# Patient Record
Sex: FEMALE | Race: WHITE
[De-identification: names, ages, dates, MRNs, and addresses within clinical notes are randomized per-mention and may not be internally consistent; named-entity substitution may affect disease eponyms.]

---

## 2019-12-05 ENCOUNTER — HOSPITAL ENCOUNTER (EMERGENCY)
Dept: HOSPITAL 97 - ER | Age: 64
Discharge: HOME | End: 2019-12-05
Payer: COMMERCIAL

## 2019-12-05 VITALS — SYSTOLIC BLOOD PRESSURE: 121 MMHG | DIASTOLIC BLOOD PRESSURE: 69 MMHG

## 2019-12-05 VITALS — OXYGEN SATURATION: 95 %
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
## 2019-12-05 DIAGNOSIS — F17.210: ICD-10-CM

## 2019-12-05 DIAGNOSIS — Z79.82: ICD-10-CM

## 2019-12-05 DIAGNOSIS — Z86.73: ICD-10-CM

## 2019-12-05 DIAGNOSIS — E78.5: ICD-10-CM

## 2019-12-05 DIAGNOSIS — S30.1XXA: Primary | ICD-10-CM
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
## 2019-12-05 LAB
ALBUMIN SERPL BCP-MCNC: 3.4 G/DL (ref 3.4–5)
ALP SERPL-CCNC: 110 U/L (ref 45–117)
ALT SERPL W P-5'-P-CCNC: 20 U/L (ref 12–78)
AST SERPL W P-5'-P-CCNC: 15 U/L (ref 15–37)
BUN BLD-MCNC: 11 MG/DL (ref 7–18)
GLUCOSE SERPLBLD-MCNC: 168 MG/DL (ref 74–106)
HCT VFR BLD CALC: 38.6 % (ref 36–45)
LIPASE SERPL-CCNC: 54 U/L (ref 73–393)
LYMPHOCYTES # SPEC AUTO: 3.2 K/UL (ref 0.7–4.9)
PMV BLD: 8.5 FL (ref 7.6–11.3)
POTASSIUM SERPL-SCNC: 3.6 MMOL/L (ref 3.5–5.1)
RBC # BLD: 4.43 M/UL (ref 3.86–4.86)

## 2019-12-05 PROCEDURE — 80076 HEPATIC FUNCTION PANEL: CPT

## 2019-12-05 PROCEDURE — 83690 ASSAY OF LIPASE: CPT

## 2019-12-05 PROCEDURE — 99284 EMERGENCY DEPT VISIT MOD MDM: CPT

## 2019-12-05 PROCEDURE — 73552 X-RAY EXAM OF FEMUR 2/>: CPT

## 2019-12-05 PROCEDURE — 80048 BASIC METABOLIC PNL TOTAL CA: CPT

## 2019-12-05 PROCEDURE — 73562 X-RAY EXAM OF KNEE 3: CPT

## 2019-12-05 PROCEDURE — 70450 CT HEAD/BRAIN W/O DYE: CPT

## 2019-12-05 PROCEDURE — 73060 X-RAY EXAM OF HUMERUS: CPT

## 2019-12-05 PROCEDURE — 74177 CT ABD & PELVIS W/CONTRAST: CPT

## 2019-12-05 PROCEDURE — 71045 X-RAY EXAM CHEST 1 VIEW: CPT

## 2019-12-05 PROCEDURE — 85025 COMPLETE CBC W/AUTO DIFF WBC: CPT

## 2019-12-05 PROCEDURE — 36415 COLL VENOUS BLD VENIPUNCTURE: CPT

## 2019-12-05 NOTE — RAD REPORT
EXAM DESCRIPTION:  RAD - Knee Left 3 View - 12/5/2019 10:59 am

 

CLINICAL HISTORY:   Left knee pain status post injury

 

FINDINGS:  No fracture or dislocation is seen.

 

Bones are osteoporotic

## 2019-12-05 NOTE — RAD REPORT
EXAM DESCRIPTION:  CT - Abdomen   Pelvis W Contrast - 12/5/2019 11:50 am

 

CLINICAL HISTORY:  Abdominal pain

 

COMPARISON:  2012

 

TECHNIQUE:  Computed axial tomography of the abdomen pelvis was obtained. 100 cc Isovue-300 was admin
istered intravenously. Oral contrast was not requested which limits evaluation of bowel.

 

All CT scans are performed using dose optimization technique as appropriate and may include automated
 exposure control or mA/KV adjustment according to patient size.

 

FINDINGS:  Fatty liver

 

The spleen, pancreas, adrenals and left kidney unremarkable

 

Malrotation of the right kidney. Right kidney is otherwise unremarkable

 

There is no evidence of diverticulitis. Hysterectomy. Cholecystectomy

 

Duodenal diverticulum

 

IMPRESSION:  No acute abnormality is displayed.

## 2019-12-05 NOTE — RAD REPORT
EXAM DESCRIPTION:  CT - Head Brain Wo Cont - 12/5/2019 11:50 am

 

CLINICAL HISTORY:  Head injury status post fall. Headache

 

COMPARISON:  2015

 

TECHNIQUE:  Computed axial tomography of the head was obtained. IV contrast was not requested.

 

All CT scans are performed using dose optimization technique as appropriate and may include automated
 exposure control or mA/KV adjustment according to patient size.

 

FINDINGS:  An intracranial  bleed is not seen .

 

The ventricles are normal in caliber.

 

No extra-axial fluid collection is noted.

 

Moderate low-density areas present within the right frontal lobe extending into the right internal ca
psule and basal ganglia compatible with old infarction.

 

Fluid within the sinuses/ mastoids is not seen.

 

IMPRESSION:  No acute intracranial abnormality is seen. If patient's symptoms persist  MRI of the bra
in would be recommended.

## 2019-12-05 NOTE — RAD REPORT
EXAM DESCRIPTION:  RAD - Humerus Left - 12/5/2019 10:59 am

 

CLINICAL HISTORY:  Fall, left arm pain

 

COMPARISON:  None.

 

FINDINGS:  No fracture is identified.  There is no dislocation or periosteal reaction noted. No forei
gn body or other soft tissue abnormality. Patient has degenerative change at the AC joint without spu
rring. Acromial humeral joint space is normal. Elbow joint is not optimally imaged; however, no signi
ficant degenerative change or acute finding seen.

 

IMPRESSION:  No fracture or acute left humerus finding.

## 2019-12-05 NOTE — ER
Nurse's Notes                                                                                     

 [facility]                                                                 

Name: Kailee Whitley                                                                             

Age: 64 yrs                                                                                       

Sex: Female                                                                                       

: 1955                                                                                   

MRN: [id number]                                                                                   

Arrival Date: 2019                                                                          

Time: 10:02                                                                                       

Account#: [id number]                                                                            

Bed 14                                                                                            

Private MD:                                                                                       

Diagnosis: Pain in left knee;Contusion of abdominal wall;Fall from bed                            

                                                                                                  

Presentation:                                                                                     

                                                                                             

09:56 Presenting complaint: EMS states: Pt. is A \T\ O x 4 and fell last night, c/o left knee   rb1

      pain 8/10. [facility] reports that the pt. face looks more swollen than usual,       

      but some swelling is normal for the pt. History of CVA, left sided deficits,                

      hypertension, and GERD. NKDA. /85,P 102, R 98% RA, R 16. Transition of care:          

      [facility]. Onset of symptoms was 2019. Risk Assessment: Do you         

      want to hurt yourself or someone else? Patient reports no desire to harm self or            

      others. Initial Sepsis Screen: Does the patient meet any 2 criteria? No. Patient's          

      initial sepsis screen is negative. Does the patient have a suspected source of              

      infection? No. Patient's initial sepsis screen is negative. Care prior to arrival: None.    

09:56 Method Of Arrival: EMS: [facility]                                                       rb1 

09:56 Acuity: LEATHA 3                                                                           rb1 

                                                                                                  

Triage Assessment:                                                                                

09:56 General: Appears in no apparent distress. comfortable, Behavior is calm, cooperative,   rb1 

      Denies fever, feeling ill. Pain: Complains of pain in left knee and right thigh Pain        

      currently is 8 out of 10 on a pain scale. Pain began 1 day ago. Neuro: Level of             

      Consciousness is awake, alert, obeys commands, Oriented to person, place, time,             

      situation. Cardiovascular: Capillary refill < 3 seconds is brisk in bilateral fingers.      

      Respiratory: Airway is patent Respiratory effort is even, unlabored, Respiratory            

      pattern is regular, symmetrical. GI: No signs and/or symptoms were reported involving       

      the gastrointestinal system. : No signs and/or symptoms were reported regarding the       

      genitourinary system. Derm: Bruising that is yellow, on left knee, left shin.               

09:56 Derm: Bruising that is dark purple, on epigastric area Pt. reports when she fell        rb1 

      yesterday that she hit the brake on her wheelchair that is next to the bed..                

      Musculoskeletal: Range of motion: left sided deficits due to previous stroke.               

                                                                                                  

Historical:                                                                                       

- Allergies:                                                                                      

09:56 No Known Allergies;                                                                     rb1 

- Home Meds:                                                                                      

:56 alendronate 70 mg/75 mL Oral soln 75 mL once wkly [Active]; aspirin 325 mg Oral tab 1   rb1 

      tab once daily [Active]; atorvastatin 40 mg oral tab [Active]; baclofen 10 mg Oral tab      

      1 tab 3 times per day [Active]; cyanocobalamin (vitamin B-12) 100 mcg oral tab daily        

      [Active]; cyclobenzaprine 10 mg Oral tab 1 tab 2 times per day [Active]; Ditropan XL 5      

      mg Oral tr24 1 tab once daily [Active]; duloxetine 60 mg Oral cpDR 2 caps once daily        

      [Active]; docusate sodium 250 mg Oral cap 1 cap once daily [Active]; hydroxyzine            

      pamoate 25 mg Oral cap 1 cap 3 times per day [Active]; ipratropium-albuterol 0.5 mg-3       

      mg(2.5 mg base)/3 mL Inhl nebu 3 mL 4 times per day [Active]; loratadine 10 mg oral tab     

      1 tab once daily [Active]; losartan 50 mg oral tab 2 tabs once daily [Active]; Lyrica       

      75 mg Oral 1 cap 3 times per day [Active]; melatonin 3 mg Oral tab 2 tabs nightly           

      [Active]; metoprolol tartrate 25 mg Oral tab 1 tab 2 times per day [Active];                

      mirtazapine 30 mg Oral tab 1 tab once daily [Active]; multivitamin oral oral [Active];      

09:56 Norco  mg Oral tab 1 tab three times a day [Active]; omeprazole 20 mg Oral TbEC   rb1 

      20 mg every 8 hours [Active]; Vitamin D3 oral 500-200 mg-unit oral 1 tab twice a day        

      [Active]; propranolol 40 mg Oral tab 1 tab 2 times per day [Active]; ropinrole hcl 0.25     

      mg 1 tab nightly [Active]; senna 8.6 mg oral cap 2 caps two times a day [Active];           

- PMHx:                                                                                           

09:56 acid reflux; CVA; Diverticulitis; Hyperlipidemia; TIA;                                  rb1 

                                                                                                  

- Immunization history:: Adult Immunizations up to date.                                          

- Social history:: Smoking status: Patient uses tobacco products, smokes one-half pack            

  cigarettes per day.                                                                             

- Ebola Screening: : Patient negative for fever greater than or equal to 101.5 degrees            

  Fahrenheit, and additional compatible Ebola Virus Disease symptoms.                             

                                                                                                  

                                                                                                  

Screenin:56 Abuse screen: Denies threats or abuse. Nutritional screening: No deficits noted.        rb1 

      Tuberculosis screening: No symptoms or risk factors identified. Fall Risk Fall in past      

      12 months (25 points). Secondary diagnosis (15 points) CVA, IV access (20 points).          

      Ambulatory Aid- Crutches/Cane/Walker (15 pts). Gait- Normal/Bed Rest/Wheelchair (0 pts)     

      Mental Status- Oriented to own ability (0 pts). Total Lloyd Fall Scale indicates High       

      Risk Score (45 or more points). Fall prevention measures have been instituted. Side         

      Rails Up X 2 Placed Close to Nursing Station 1:1 Attendant Assigned Frequent                

      Obs/Assessments Occuring As available patient and family educated on Fall Prevention        

      Program and Strategies.                                                                     

                                                                                                  

Assessment:                                                                                       

09:56 General: See triage assessment.                                                         rb1 

11:00 Reassessment: Patient appears in no apparent distress at this time. No changes from     rb1 

      previously documented assessment.                                                           

11:30 Reassessment: Patient appears in no apparent distress at this time. Patient and/or      rb1 

      family updated on plan of care and expected duration. Pain level reassessed. Patient is     

      alert, oriented x 3, equal unlabored respirations, skin warm/dry/pink.                      

12:12 Reassessment: Patient appears in no apparent distress at this time. Pt. is resting with rb1 

      eyes closed, respirations even, unlabored. Call light within reach.                         

12:55 Reassessment: Called [place] and gave report, requested transportation back to the    [facility] 

      facility. They will provide transportation, will call us back when they make                

      arrangements.                                                                               

13:03 Reassessment: Discharge pending due to transportation back to the facility.             rb1 

13:22 Reassessment: Patient appears in no apparent distress at this time. Patient and/or      rb1 

      family updated on plan of care and expected duration. Pain level reassessed. Patient is     

      alert, oriented x 3, equal unlabored respirations, skin warm/dry/pink. Awaiting             

      transportation back to the facililty.                                                       

13:45 Reassessment: Patient appears in no apparent distress at this time. No changes from     rb1 

      previously documented assessment.                                                           

14:07 Reassessment: Patient appears in no apparent distress at this time. Patient and/or      rb1 

      family updated on plan of care and expected duration. Pain level reassessed. Patient is     

      alert, oriented x 3, equal unlabored respirations, skin warm/dry/pink. Transportation       

      has arrived.                                                                                

                                                                                                  

Vital Signs:                                                                                      

09:56  / 81; Pulse 102; Resp 16; Temp 98.1(O); Pulse Ox 96% on R/A; Weight 76.66 kg     rb1 

      (R); Height 5 ft. 1 in. (154.94 cm) (R); Pain 8/10;                                         

11:00  / 73; Pulse 78; Resp 19; Pulse Ox 95% on R/A; Pain 8/10;                         rb1 

11:30  / 69; Pulse 76; Resp 21; Pulse Ox 95% on R/A;                                    rb1 

12:30  / 71; Pulse 70; Resp 17; Pulse Ox 95% on R/A;                                    rb1 

13:30  / 78; Pulse 71; Resp 15; Temp 98.3(O); Pulse Ox 96% on R/A; Pain 6/10;           rb1 

09:56 Body Mass Index 31.93 (76.66 kg, 154.94 cm)                                             rb1 

                                                                                                  

ED Course:                                                                                        

09:56 Arm band placed on right wrist.                                                         rb1 

09:56 Patient has correct armband on for positive identification. Placed in gown. Bed in low  rb1 

      position. Call light in reach. Side rails up X2. Cardiac monitor on. Pulse ox on. NIBP      

      on. Warm blanket given.                                                                     

10:02 Patient arrived in ED.                                                                  em1 

10:07 Jazz Campbell, RN is Primary Nurse.                                                   rb1 

10:13 Triage completed.                                                                       rb1 

10:14 Bernadette Alberto FNP-C is PHCP.                                                        kb  

10:14 Yusef Pruitt MD is Attending Physician.                                                kb  

11:02 Initial lab(s) drawn, by me, sent to lab. Inserted saline lock: 22 gauge in right       jb1 

      antecubital area, using aseptic technique. Blood collected.                                 

11:20 Femur Right XRAY In Process Unspecified.                                                EDMS

11:20 Knee Left 3 View XRAY In Process Unspecified.                                           EDMS

11:20 Humerus Left XRAY In Process Unspecified.                                               EDMS

11:20 Chest Single View XRAY In Process Unspecified.                                          EDMS

12:05 CT Head Brain wo Cont In Process Unspecified.                                           EDMS

12:05 CT Abd/Pelvis - IV Contrast Only In Process Unspecified.                                EDMS

14:11 No provider procedures requiring assistance completed. IV discontinued, intact,         rb1 

      bleeding controlled, No redness/swelling at site. Pressure dressing applied.                

                                                                                                  

Administered Medications:                                                                         

No medications were administered                                                                  

                                                                                                  

                                                                                                  

Outcome:                                                                                          

12:48 Discharge ordered by MD.                                                                kb  

14:11 Patient left the ED.                                                                    rb1 

14:11 Discharged to [facility] by their transportation service.                        rb1 

14:11 Condition: stable                                                                           

14:11 Discharge instructions given to patient, Instructed on discharge instructions, follow       

      up and referral plans. Demonstrated understanding of instructions, follow-up care,          

      Prescriptions given X none                                                                  

                                                                                                  

Signatures:                                                                                       

Dispatcher [employer]                           EDMS                                                 

Cheo Bruno                                 jb1                                                  

Bernadette Alberto, ELIJAH SOMMER-Robert Joyce                               em1                                                  

Jazz Campbell, RN                     RN   rb1                                                  

                                                                                                  

Corrections: (The following items were deleted from the chart)                                    

15:36 09:56  / 81; Pulse 102bpm; Resp 16bpm; Pulse Ox 96% RA; 76.66 kg Reported; Height rb1 

      5 ft. 1 in. Reported; BMI: 31.9; Pain 8/10; rb1                                             

15:36 12:30  / 78; Pulse 71bpm; Resp 15bpm; Pulse Ox 96% RA; Temp 98.3F Oral; Pain      rb1 

      6/10; rb1                                                                                   

                                                                                                  

**************************************************************************************************

## 2019-12-05 NOTE — RAD REPORT
EXAM DESCRIPTION:  RAD - Chest Single View - 12/5/2019 10:59 am

 

CLINICAL HISTORY:  Fall, chest pain

 

COMPARISON:  August 2016

 

TECHNIQUE:  AP portable chest image was obtained 1048 hours .

 

FINDINGS:  No pulmonary contusion or acute lung parenchymal process. Interstitial pattern is not subs
tantially different from comparison. Minimal edema or infiltrate could be masked by the chronic patte
rn. Heart and vasculature are normal. No measurable pleural effusion and no pneumothorax. No gross olvin
ny abnormality seen on portable imaging. No acute aortic findings suspected.

 

IMPRESSION:  No acute cardiopulmonary process.

## 2019-12-05 NOTE — RAD REPORT
EXAM DESCRIPTION:  RAD - Femur Right - 12/5/2019 10:59 am

 

CLINICAL HISTORY:  Fall, right leg pain

 

COMPARISON:  None.

 

FINDINGS:  No fracture, dislocation or periosteal reaction noted. Patient has minimal right hip joint
 degenerative change. Femoral head maintains smooth rounded contour. Degenerative changes are present
 at the knee joint. Medial compartment and patellofemoral compartment marginal spurs are present. No 
measurable joint effusion. No air or foreign body in the soft tissues.

 

IMPRESSION:  Hip and knee joint degenerative changes are present but no fracture or acute finding see
n.

## 2019-12-05 NOTE — EDPHYS
Physician Documentation                                                                           

 [facility]                                                                 

Name: Kailee Whitley                                                                             

Age: 64 yrs                                                                                       

Sex: Female                                                                                       

: 1955                                                                                   

MRN: [id number]                                                                                   

Arrival Date: 2019                                                                          

Time: 10:02                                                                                       

Account#: [id number]                                                                            

Bed 14                                                                                            

Private MD:                                                                                       

ED Physician Yusef Pruitt                                                                         

HPI:                                                                                              

                                                                                             

10:29 This 64 yrs old  Female presents to ER via EMS with complaints of Left Knee    kb  

      pain.                                                                                       

10:29 Details of fall: The patient fell from a height, off furniture, approximately 3 feet.   kb  

      Onset: The symptoms/episode began/occurred last night. Associated injuries: The patient     

      sustained injury to the abdomen, specifically the epigastric area, ecchymosis,              

      tenderness, left knee, ecchymosis, painful injury. Severity of symptoms: At their worst     

      the symptoms were moderate, in the emergency department the symptoms are unchanged. The     

      patient has experienced similar episodes in the past. The patient has not recently seen     

      a physician. Pt reports she fell out of bed twice last night, c/o left knee pain.           

      States the nursing home staff said her face looked more swollen as well, but she            

      normally has some facial swelling. No obvious swelling noted compared to pt profile         

      picture from nursing home. Pt denies any other pain. Upon exam there is a bruise to         

      epigastric area. Pt reports she hit that spot with the wheelchair when she fell, but it     

      only hurts upon palpations. Reports tenderness to upper left arm and right leg upon         

      exam as well. .                                                                             

                                                                                                  

Historical:                                                                                       

- Allergies:                                                                                      

09:56 No Known Allergies;                                                                     rb1 

- Home Meds:                                                                                      

09:56 alendronate 70 mg/75 mL Oral soln 75 mL once wkly [Active]; aspirin 325 mg Oral tab 1   rb1 

      tab once daily [Active]; atorvastatin 40 mg oral tab [Active]; baclofen 10 mg Oral tab      

      1 tab 3 times per day [Active]; cyanocobalamin (vitamin B-12) 100 mcg oral tab daily        

      [Active]; cyclobenzaprine 10 mg Oral tab 1 tab 2 times per day [Active]; Ditropan XL 5      

      mg Oral tr24 1 tab once daily [Active]; duloxetine 60 mg Oral cpDR 2 caps once daily        

      [Active]; docusate sodium 250 mg Oral cap 1 cap once daily [Active]; hydroxyzine            

      pamoate 25 mg Oral cap 1 cap 3 times per day [Active]; ipratropium-albuterol 0.5 mg-3       

      mg(2.5 mg base)/3 mL Inhl nebu 3 mL 4 times per day [Active]; loratadine 10 mg oral tab     

      1 tab once daily [Active]; losartan 50 mg oral tab 2 tabs once daily [Active]; Lyrica       

      75 mg Oral 1 cap 3 times per day [Active]; melatonin 3 mg Oral tab 2 tabs nightly           

      [Active]; metoprolol tartrate 25 mg Oral tab 1 tab 2 times per day [Active];                

      mirtazapine 30 mg Oral tab 1 tab once daily [Active]; multivitamin oral oral [Active];      

09:56 Norco  mg Oral tab 1 tab three times a day [Active]; omeprazole 20 mg Oral TbEC   rb1 

      20 mg every 8 hours [Active]; Vitamin D3 oral 500-200 mg-unit oral 1 tab twice a day        

      [Active]; propranolol 40 mg Oral tab 1 tab 2 times per day [Active]; ropinrole hcl 0.25     

      mg 1 tab nightly [Active]; senna 8.6 mg oral cap 2 caps two times a day [Active];           

- PMHx:                                                                                           

09:56 acid reflux; CVA; Diverticulitis; Hyperlipidemia; TIA;                                  rb1 

                                                                                                  

- Immunization history:: Adult Immunizations up to date.                                          

- Social history:: Smoking status: Patient uses tobacco products, smokes one-half pack            

  cigarettes per day.                                                                             

- Ebola Screening: : Patient negative for fever greater than or equal to 101.5 degrees            

  Fahrenheit, and additional compatible Ebola Virus Disease symptoms.                             

                                                                                                  

                                                                                                  

ROS:                                                                                              

10:27 Constitutional: Negative for fever, chills, and weight loss, ENT: Negative for injury,  kb  

      pain, and discharge, Neck: Negative for injury, pain, and swelling, Cardiovascular:         

      Negative for chest pain, palpitations, and edema, Respiratory: Negative for shortness       

      of breath, cough, wheezing, and pleuritic chest pain, Abdomen/GI: Negative for              

      abdominal pain, nausea, vomiting, diarrhea, and constipation, Back: Negative for injury     

      and pain, Skin: Negative for injury, rash, and discoloration, Neuro: Negative for           

      headache, weakness, numbness, tingling, and seizure.                                        

10:27 MS/extremity: Positive for injury or acute deformity, ecchymosis, pain, swelling,           

      tenderness.                                                                                 

                                                                                                  

Exam:                                                                                             

10:21 Constitutional:  This is a well developed, well nourished patient who is awake, alert,  kb  

      and in no acute distress. Head/Face:  Normocephalic, atraumatic. Eyes:  Pupils equal        

      round and reactive to light, extra-ocular motions intact.  Lids and lashes normal.          

      Conjunctiva and sclera are non-icteric and not injected.  Cornea within normal limits.      

      Periorbital areas with no swelling, redness, or edema. ENT:  Nares patent. No nasal         

      discharge, no septal abnormalities noted.  Tympanic membranes are normal and external       

      auditory canals are clear.  Oropharynx with no redness, swelling, or masses, exudates,      

      or evidence of obstruction, uvula midline.  Mucous membranes moist. Neck:  Trachea          

      midline, no thyromegaly or masses palpated, and no cervical lymphadenopathy.  Supple,       

      full range of motion without nuchal rigidity, or vertebral point tenderness.  No            

      Meningismus. Chest/axilla:  Normal chest wall appearance and motion.  Nontender with no     

      deformity.  No lesions are appreciated. Cardiovascular:  Regular rate and rhythm with a     

      normal S1 and S2.  No gallops, murmurs, or rubs.  Normal PMI, no JVD.  No pulse             

      deficits. Respiratory:  Lungs have equal breath sounds bilaterally, clear to                

      auscultation and percussion.  No rales, rhonchi or wheezes noted.  No increased work of     

      breathing, no retractions or nasal flaring. Back:  No spinal tenderness.  No                

      costovertebral tenderness.  Full range of motion. Skin:  Warm, dry with normal turgor.      

      Normal color with no rashes, no lesions, and no evidence of cellulitis.                     

10:21 Abdomen/GI: Inspection: bruising, epigastric area, Bowel sounds: normal, in all             

      quadrants, Palpation: soft, in all quadrants, nontender, in the right upper quadrant,       

      left upper quadrant, right lower quadrant and left lower quadrant, mild abdominal           

      tenderness, in the epigastric area, over bruised area.                                      

10:21 Musculoskeletal/extremity: Extremities: grossly normal except: noted in the left knee:      

      contusion, ecchymosis, pain, swelling, tenderness, bruising appears old (yellow in          

      color), noted in the left upper arm: tenderness, noted in the right quadriceps:             

      tenderness, ROM: full active range of motion, in the right arm, right leg and left leg,     

      full passive range of motion, in the right arm, right leg and left leg, limited passive     

      range of motion, in the left arm, contracted from previous stroke, Pulses: are normal       

      with no appreciated deficits, Sensation intact. Weight bearing: can bear weight with        

      assistance only, uses cane.                                                                 

10:28 Neuro: Orientation: is normal, Mentation: is normal, Memory: is normal, Motor: no acute kb  

      changes, Sensation: is normal.                                                              

                                                                                                  

Vital Signs:                                                                                      

09:56  / 81; Pulse 102; Resp 16; Temp 98.1(O); Pulse Ox 96% on R/A; Weight 76.66 kg     rb1 

      (R); Height 5 ft. 1 in. (154.94 cm) (R); Pain 8/10;                                         

11:00  / 73; Pulse 78; Resp 19; Pulse Ox 95% on R/A; Pain 8/10;                         rb1 

11:30  / 69; Pulse 76; Resp 21; Pulse Ox 95% on R/A;                                    rb1 

12:30  / 71; Pulse 70; Resp 17; Pulse Ox 95% on R/A;                                    rb1 

13:30  / 78; Pulse 71; Resp 15; Temp 98.3(O); Pulse Ox 96% on R/A; Pain 6/10;           rb1 

09:56 Body Mass Index 31.93 (76.66 kg, 154.94 cm)                                             rb1 

                                                                                                  

MDM:                                                                                              

10:14 Patient medically screened.                                                             kb  

10:27 Data reviewed: vital signs, nurses notes. Data interpreted: Pulse oximetry: on room air kb  

      is 96 %. Interpretation: normal.                                                            

12:34 Counseling: I had a detailed discussion with the patient and/or guardian regarding: the kb  

      historical points, exam findings, and any diagnostic results supporting the                 

      discharge/admit diagnosis, lab results, radiology results, the need for outpatient          

      follow up, a family practitioner, to return to the emergency department if symptoms         

      worsen or persist or if there are any questions or concerns that arise at home.             

                                                                                                  

                                                                                             

10:26 Order name: Basic Metabolic Panel; Complete Time: 11:38                                 kb  

                                                                                             

10:26 Order name: CBC with Diff; Complete Time: 11:19                                         kb  

                                                                                             

10:21 Order name: CT Head Brain wo Cont; Complete Time: 12:21                                 kb  

                                                                                             

10:21 Order name: Femur Right XRAY; Complete Time: 11:38                                      kb  

                                                                                             

10:26 Order name: Hepatic Function; Complete Time: 11:38                                      kb  

                                                                                             

10:26 Order name: Lipase; Complete Time: 12:21                                                kb  

                                                                                             

10:21 Order name: Knee Left 3 View XRAY; Complete Time: 11:38                                 kb  

                                                                                             

10:21 Order name: Humerus Left XRAY; Complete Time: 11:38                                     kb  

                                                                                             

10:21 Order name: Chest Single View XRAY; Complete Time: 13:03                                kb  

                                                                                             

10:26 Order name: IV Saline Lock; Complete Time: 11:02                                        kb  

                                                                                             

10:26 Order name: Labs collected and sent; Complete Time: 11:02                               kb  

                                                                                             

10:26 Order name: CT Abd/Pelvis - IV Contrast Only; Complete Time: 12:21                      kb  

                                                                                                  

Administered Medications:                                                                         

No medications were administered                                                                  

                                                                                                  

                                                                                                  

Disposition:                                                                                      

17:20 Co-signature as Attending Physician, Yusef Pruitt MD.                                    rn  

                                                                                                  

Disposition:                                                                                      

19 12:48 Discharged to Home. Impression: Pain in left knee, Contusion of abdominal wall,    

  Fall from bed.                                                                                  

- Condition is Stable.                                                                            

- Discharge Instructions: Musculoskeletal Pain, Contusion, Easy-to-Read, Fall                     

  Prevention in the Home, Easy-to-Read.                                                           

                                                                                                  

- Medication Reconciliation Form, Thank You Letter, Antibiotic Education, Prescription            

  Opioid Use form.                                                                                

- Follow up: Emergency Department; When: As needed; Reason: Worsening of condition.               

  Follow up: Private Physician; When: 2 - 3 days; Reason: Recheck today's complaints,             

  Continuance of care, Re-evaluation by your physician.                                           

                                                                                                  

                                                                                                  

                                                                                                  

Signatures:                                                                                       

Dispatcher [employer]                           [facility]                                                 

Bernadette Alberto, FNP-C                 FNP-Ckb                                                   

Yusef Pruitt MD MD rn Barber, Rebecca, RN                     RN   rb1                                                  

                                                                                                  

Corrections: (The following items were deleted from the chart)                                    

10:29 10:21 Constitutional: This is a well developed, well nourished patient who is awake,    kb  

      alert, and in no acute distress. Head/Face: Normocephalic, atraumatic. Eyes: Pupils         

      equal round and reactive to light, extra-ocular motions intact. Lids and lashes normal.     

      Conjunctiva and sclera are non-icteric and not injected. Cornea within normal limits.       

      Periorbital areas with no swelling, redness, or edema. ENT: Nares patent. No nasal          

      discharge, no septal abnormalities noted. Tympanic membranes are normal and external        

      auditory canals are clear. Oropharynx with no redness, swelling, or masses, exudates,       

      or evidence of obstruction, uvula midline. Mucous membranes moist. Neck: Trachea            

      midline, no thyromegaly or masses palpated, and no cervical lymphadenopathy. Supple,        

      full range of motion without nuchal rigidity, or vertebral point tenderness. No             

      Meningismus. Chest/axilla: Normal chest wall appearance and motion. Nontender with no       

      deformity. No lesions are appreciated. Cardiovascular: Regular rate and rhythm with a       

      normal S1 and S2. No gallops, murmurs, or rubs. Normal PMI, no JVD. No pulse deficits.      

      Respiratory: Lungs have equal breath sounds bilaterally, clear to auscultation and          

      percussion. No rales, rhonchi or wheezes noted. No increased work of breathing, no          

      retractions or nasal flaring. Back: No spinal tenderness. No costovertebral tenderness.     

      Full range of motion. Skin: Warm, dry with normal turgor. Normal color with no rashes,      

      no lesions, and no evidence of cellulitis. Neuro: Awake and alert, GCS 15, oriented to      

      person, place, time, and situation. Cranial nerves II-XII grossly intact. Motor             

      strength 5/5 in all extremities. Sensory grossly intact. Cerebellar exam normal. Normal     

      gait. kb                                                                                    

11:41 10:21 Musculoskeletal/extremity: Extremities: grossly normal except: noted in the left  kb  

      knee: contusion, ecchymosis, pain, swelling, tenderness, ROM: full active range of          

      motion, in the right arm, right leg and left leg, full passive range of motion, in the      

      right arm, right leg and left leg, limited passive range of motion, in the left arm,        

      contracted from previous stroke, Pulses: are normal with no appreciated deficits,           

      Sensation intact. Weight bearing: can bear weight with assistance only, uses cane, kb       

14:11 12:48 2019 12:48 Discharged to Home. Impression: Pain in left knee; Contusion of  rb1 

      abdominal wall; Fall from bed. Condition is Stable. Discharge Instructions:                 

      Musculoskeletal Pain, Contusion, Easy-to-Read, Fall Prevention in the Home,                 

      Easy-to-Read. Forms are Medication Reconciliation Form, Thank You Letter, Antibiotic        

      Education, Prescription Opioid Use. Follow up: Emergency Department; When: As needed;       

      Reason: Worsening of condition. Follow up: Private Physician; When: 2 - 3 days; Reason:     

      Recheck today's complaints, Continuance of care, Re-evaluation by your physician. kb        

                                                                                                  

**************************************************************************************************

## 2020-12-09 ENCOUNTER — HOSPITAL ENCOUNTER (EMERGENCY)
Dept: HOSPITAL 97 - ER | Age: 65
LOS: 1 days | Discharge: NURSING, REHAB OR LONG-TERM CARE | End: 2020-12-10
Payer: COMMERCIAL

## 2020-12-09 DIAGNOSIS — A41.9: ICD-10-CM

## 2020-12-09 DIAGNOSIS — Z20.828: ICD-10-CM

## 2020-12-09 DIAGNOSIS — N17.9: Primary | ICD-10-CM

## 2020-12-09 DIAGNOSIS — I95.9: ICD-10-CM

## 2020-12-09 DIAGNOSIS — N39.0: ICD-10-CM

## 2020-12-09 LAB
ALBUMIN SERPL BCP-MCNC: 3.2 G/DL (ref 3.4–5)
ALP SERPL-CCNC: 40 U/L (ref 45–117)
ALT SERPL W P-5'-P-CCNC: 19 U/L (ref 12–78)
AMYLASE SERPL-CCNC: 27 U/L (ref 25–115)
AST SERPL W P-5'-P-CCNC: 19 U/L (ref 15–37)
BUN BLD-MCNC: 44 MG/DL (ref 7–18)
CAOX CRY URNS QL MICRO: (no result)
CKMB CREATINE KINASE MB: < 1 NG/ML (ref 0.3–3.6)
GLUCOSE SERPLBLD-MCNC: 177 MG/DL (ref 74–106)
HCT VFR BLD CALC: 35.3 % (ref 36–45)
INR BLD: 1.07
LIPASE SERPL-CCNC: 62 U/L (ref 73–393)
LYMPHOCYTES # SPEC AUTO: 2.8 K/UL (ref 0.7–4.9)
PMV BLD: 8.9 FL (ref 7.6–11.3)
POTASSIUM SERPL-SCNC: 4.1 MMOL/L (ref 3.5–5.1)
RBC # BLD: 3.89 M/UL (ref 3.86–4.86)
TROPONIN I: < 0.02 NG/ML (ref 0–0.04)
URINE UROTHELIAL CELLS: <5 /HPF

## 2020-12-09 PROCEDURE — 84484 ASSAY OF TROPONIN QUANT: CPT

## 2020-12-09 PROCEDURE — 82553 CREATINE MB FRACTION: CPT

## 2020-12-09 PROCEDURE — 80048 BASIC METABOLIC PNL TOTAL CA: CPT

## 2020-12-09 PROCEDURE — 99292 CRITICAL CARE ADDL 30 MIN: CPT

## 2020-12-09 PROCEDURE — 80076 HEPATIC FUNCTION PANEL: CPT

## 2020-12-09 PROCEDURE — 85610 PROTHROMBIN TIME: CPT

## 2020-12-09 PROCEDURE — 36415 COLL VENOUS BLD VENIPUNCTURE: CPT

## 2020-12-09 PROCEDURE — 93005 ELECTROCARDIOGRAM TRACING: CPT

## 2020-12-09 PROCEDURE — 74176 CT ABD & PELVIS W/O CONTRAST: CPT

## 2020-12-09 PROCEDURE — 36556 INSERT NON-TUNNEL CV CATH: CPT

## 2020-12-09 PROCEDURE — 99291 CRITICAL CARE FIRST HOUR: CPT

## 2020-12-09 PROCEDURE — 87086 URINE CULTURE/COLONY COUNT: CPT

## 2020-12-09 PROCEDURE — 70450 CT HEAD/BRAIN W/O DYE: CPT

## 2020-12-09 PROCEDURE — 87088 URINE BACTERIA CULTURE: CPT

## 2020-12-09 PROCEDURE — 82550 ASSAY OF CK (CPK): CPT

## 2020-12-09 PROCEDURE — 83690 ASSAY OF LIPASE: CPT

## 2020-12-09 PROCEDURE — 71250 CT THORAX DX C-: CPT

## 2020-12-09 PROCEDURE — 81015 MICROSCOPIC EXAM OF URINE: CPT

## 2020-12-09 PROCEDURE — 82947 ASSAY GLUCOSE BLOOD QUANT: CPT

## 2020-12-09 PROCEDURE — 84145 PROCALCITONIN (PCT): CPT

## 2020-12-09 PROCEDURE — 83605 ASSAY OF LACTIC ACID: CPT

## 2020-12-09 PROCEDURE — 71045 X-RAY EXAM CHEST 1 VIEW: CPT

## 2020-12-09 PROCEDURE — 85025 COMPLETE CBC W/AUTO DIFF WBC: CPT

## 2020-12-09 PROCEDURE — 87205 SMEAR GRAM STAIN: CPT

## 2020-12-09 PROCEDURE — 72125 CT NECK SPINE W/O DYE: CPT

## 2020-12-09 PROCEDURE — 81003 URINALYSIS AUTO W/O SCOPE: CPT

## 2020-12-09 PROCEDURE — 85730 THROMBOPLASTIN TIME PARTIAL: CPT

## 2020-12-09 PROCEDURE — 51702 INSERT TEMP BLADDER CATH: CPT

## 2020-12-09 PROCEDURE — 87040 BLOOD CULTURE FOR BACTERIA: CPT

## 2020-12-09 PROCEDURE — 82150 ASSAY OF AMYLASE: CPT

## 2020-12-09 NOTE — ER
Nurse's Notes                                                                                     

 [facility]                                                                 

Name: Kailee Whitley                                                                             

Age: 65 yrs                                                                                       

Sex: Female                                                                                       

: 1955                                                                                   

MRN: [id number]                                                                                   

Arrival Date: 2020                                                                          

Time: 18:25                                                                                       

Account#: [id number]                                                                            

Bed 24                                                                                            

Private MD:                                                                                       

Diagnosis: Urinary tract infection, site not specified;Altered mental status, unspecified;Sepsis, 

  unspecified organism;Hypotension, unspecified;Acute kidney failure                              

                                                                                                  

Presentation:                                                                                     

                                                                                             

18:30 Chief complaint: EMS states: Nursing home staff called for unresponsive. Pt c/o nausea  hb  

      at lunch, was given Phenergan, found altered in bed just before dinner. ,            

      Metformin 500 mg PO administered, then recheck  so they administered glucagon        

      and oral glucose. Pt remains obtunded. Coronavirus screen: At this time, the client         

      does not indicate any symptoms associated with coronavirus-19. Ebola Screen: No             

      symptoms or risks identified at this time. Initial Sepsis Screen: Does the patient meet     

      any 2 criteria? Systolic BP < 90 mmHg. Mean Arterial Pressure (MAP) < 65. Altered           

      Mental Status. Yes Does the patient have a suspected source of infection? No. Patient's     

      initial sepsis screen is negative. Risk Assessment: Do you want to hurt yourself or         

      someone else? Patient reports no desire to harm self or others. Onset of symptoms was       

      2020.                                                                          

18:30 Acuity: LEATHA 2                                                                           hb  

18:30 Method Of Arrival: EMS: [facility]                                                         

                                                                                                  

Historical:                                                                                       

- Allergies:                                                                                      

18:33 No Known Allergies;                                                                     hb  

- PMHx:                                                                                           

18:33 acid reflux; CVA; Diverticulitis; Hyperlipidemia; TIA;                                  hb  

                                                                                                  

- Immunization history:: Adult Immunizations up to date.                                          

- Social history:: Smoking status: unknown.                                                       

- Family history:: not pertinent.                                                                 

- Hospitalizations: : No recent hospitalization is reported.                                      

- History obtained from: EMS.                                                                     

                                                                                                  

                                                                                                  

Screenin:47 Abuse screen: Denies threats or abuse. Denies injuries from another. Nutritional        ca1 

      screening: No deficits noted. Tuberculosis screening: No symptoms or risk factors           

      identified. Fall Risk Secondary diagnosis (15 points) impaired mobility, IV access (20      

      points). Total Lloyd Fall Scale indicates High Risk Score (45 or more points). Fall         

      prevention measures have been instituted. Side Rails Up X 2 Frequent Obs/Assessments        

      Occuring.                                                                                   

                                                                                                  

Assessment:                                                                                       

18:47 General: Appears in no apparent distress. Pain: Unable to use pain scale. Patient is    ca1 

      unresponsive. Neuro: Level of Consciousness is obtunded, Oriented to none.                  

      Cardiovascular: Heart tones S1 S2 present Capillary refill < 3 seconds Patient's skin       

      is warm and dry. Rhythm is sinus rhythm. Respiratory: Airway is compromised Respiratory     

      effort is even, unlabored, Respiratory pattern is regular, symmetrical. Respiratory:        

      GI: Abdomen is round non-distended, Bowel sounds present X 4 quads. Abd is soft X 4         

      quads. Derm: Skin is intact, is healthy with good turgor, Skin is pink, warm \T\ dry.       

      Musculoskeletal: Circulation, motion, and sensation intact. Capillary refill < 3            

      seconds.                                                                                    

19:38 Reassessment: Patient appears in no apparent distress at this time. No changes from     ca1 

      previously documented assessment.                                                           

19:50 Reassessment: Patient appears in no apparent distress at this time. No changes from     ca1 

      previously documented assessment.                                                           

20:30 Reassessment: Patient appears in no apparent distress at this time. No changes from     ca1 

      previously documented assessment.                                                           

21:07 Reassessment: Patient appears in no apparent distress at this time. No changes from     ca1 

      previously documented assessment.                                                           

21:51 Reassessment: Patient appears in no apparent distress at this time. PT responded to     ca1 

      verbal commands. Opened eyes. Once name was called, answered, "yes".                        

22:31 Reassessment: Patient appears in no apparent distress at this time. No changes from     ca1 

      previously documented assessment.                                                           

22:33 Reassessment: VO to decrease Levophed to 3mcg/min by Dr. Pruitt.                         ca1 

22:45 Reassessment: Pt to CT.                                                                 ca1 

23:19 Reassessment: Patient appears in no apparent distress at this time. No changes from     ca1 

      previously documented assessment. Pt responsive to verbal commands. Eyes closed at this     

      time.                                                                                       

12/10                                                                                             

00:04 Reassessment: Patient appears in no apparent distress at this time. No changes from     ca1 

      previously documented assessment.                                                           

00:04 General: Appears in no apparent distress. Behavior is drowsy. Neuro: Level of           ca1 

      Consciousness is confused, lethargic, Oriented to person, place.                            

00:47 Reassessment: Patient appears in no apparent distress at this time. No changes from     ca1 

      previously documented assessment. Called report to TERRENCE Toro.                                

01:37 Reassessment: Patient appears in no apparent distress at this time. No changes from     ca1 

      previously documented assessment.                                                           

02:38 Reassessment: Patient appears in no apparent distress at this time. No changes from     ca1 

      previously documented assessment.                                                           

                                                                                                  

Vital Signs:                                                                                      

                                                                                             

18:30 BP 70 / 42; Pulse 63; Resp 14; Temp 97.8; Pulse Ox 97% on R/A; Pain 0/10;               hb  

18:35 Weight 100 kg;                                                                          hb  

18:53 BP 67 / 54; Pulse 63; Resp 15; Pulse Ox 98% on R/A;                                     hb  

19:31 BP 75 / 45; Pulse 74; Resp 14 S; Pulse Ox 91% on R/A;                                   ca1 

19:45 BP 84 / 40; Pulse 69; Resp 14 S; Pulse Ox 90% on R/A;                                   ca1 

20:00 BP 87 / 56; Pulse 69; Resp 13 S; Pulse Ox 87% on R/A;                                   ca1 

20:01 Pulse Ox 95% on 2 lpm NC;                                                               ca1 

20:15 BP 91 / 59; Pulse 66; Resp 14 S; Pulse Ox 95% on R/A;                                   ca1 

20:30 BP 87 / 57; Pulse 68; Resp 14 S; Pulse Ox 94% on 2 lpm NC;                              ca1 

20:45 BP 86 / 53; Pulse 71; Resp 13 S; Pulse Ox 95% on R/A;                                   ca1 

21:00 BP 68 / 51; Pulse 70; Resp 13 S; Pulse Ox 96% on 2 lpm NC;                              ca1 

21:15 BP 85 / 59; Pulse 63; Resp 14 S; Pulse Ox 96% on 2 lpm NC;                              ca1 

21:30 BP 88 / 56; Pulse 69; Resp 14 S; Pulse Ox 96% on 2 lpm NC;                              ca1 

21:45  / 58; Pulse 67; Resp 14 S; Pulse Ox 96% on 2 lpm NC;                             ca1 

22:00  / 57; Pulse 67; Resp 14 S; Pulse Ox 96% on R/A;                                  ca1 

22:15  / 73; Pulse 63; Resp 15 S; Pulse Ox 96% on R/A;                                  ca1 

22:30  / 59; Pulse 65; Resp 16 S; Pulse Ox 96% on 2 lpm NC;                             ca1 

23:00  / 55; Pulse 77; Resp 15 S; Pulse Ox 100% on R/A;                                 ca1 

23:15  / 58; Pulse 67; Resp 14 S; Pulse Ox 99% on R/A;                                  ca1 

12/10                                                                                             

00:04  / 65; Pulse 74; Resp 16 S; Pulse Ox 97% on 2 lpm NC;                             ca1 

01:00  / 91; Pulse 73; Resp 18 S; Pulse Ox 100% on 2 lpm NC;                            ca1 

01:30  / 100; Pulse 73; Resp 15 S; Pulse Ox 100% on 2 lpm NC;                           ca1 

02:00 BP 90 / 60; Pulse 77; Resp 15 S; Pulse Ox 98% on 2 lpm NC;                              ca1 

02:30  / 60; Pulse 78; Resp 20 S; Pulse Ox 97% on R/A;                                  ca1 

                                                                                             

18:30 Madie (FACES)                                                                      hb  

                                                                                                  

Camryn Coma Score:                                                                               

                                                                                             

19:31 Eye Response: to pain(2). Verbal Response: incomprehensible(2). Motor Response:         ca1 

      withdraws from pain(4). Total: 8.                                                           

20:00 Eye Response: to pain(2). Verbal Response: incomprehensible(2). Motor Response:         ca1 

      withdraws from pain(4). Total: 8.                                                           

20:30 Eye Response: to pain(2). Verbal Response: incomprehensible(2). Motor Response:         ca1 

      withdraws from pain(4). Total: 8.                                                           

21:53 Eye Response: to voice(3). Verbal Response: inappropriate words(3). Motor Response:     ca1 

      localizes pain(5). Total: 11.                                                               

22:33 Eye Response: to voice(3). Verbal Response: confused(4). Motor Response: localizes      ca1 

      pain(5). Total: 12.                                                                         

12/10                                                                                             

00:09 Eye Response: to voice(3). Verbal Response: confused(4). Motor Response: obeys          ca1 

      commands(6). Total: 13.                                                                     

                                                                                                  

ED Course:                                                                                        

                                                                                             

18:25 Patient arrived in ED.                                                                  ds1 

18:32 Triage completed.                                                                       hb  

18:33 Arm band placed on.                                                                     hb  

18:42 Inserted saline lock: 20 gauge in right antecubital area, using aseptic technique.      ca1 

      Blood collected.                                                                            

18:42 Initial lab(s) drawn, by me, sent to lab. First set of blood cultures drawn by me.      ca1 

18:46 Rocio Knight, RN is Primary Nurse.                                                      ca1 

18:47 Patient has correct armband on for positive identification. Placed in gown. Bed in low  ca1 

      position. Call light in reach. Side rails up X2. Cardiac monitor on. Pulse ox on. NIBP      

      on. Warm blanket given.                                                                     

19:01 Yusef Pruitt MD is Attending Physician.                                                rn  

19:07 CT Head C Spine In Process Unspecified.                                                 EDMS

19:35 Chest Single View XRAY In Process Unspecified.                                          EDMS

19:36 Inserted saline lock: 22 gauge in left antecubital area, using aseptic technique. Blood ca1 

      collected.                                                                                  

19:36 Second set of blood cultures drawn by me.                                               ca1 

19:36 Powell cath inserted, using sterile technique, 18 Fr., by me, balloon inflated, to       ca1 

      gravity drainage, urine specimen collected. returned cloudy urine. Patient tolerated        

      well.                                                                                       

21:38 Assisted provider with central line placement. Set up central line tray. Triple lumen   ca1 

      line placed in right femoral. Line placed by Yusef Pruitt MD Placement verified by blood     

      return, Dressed with Tegaderm, Patient tolerated well. Time-out/Briefing performed          

      prior to start of procedure? Yes. Was handwashing/sanitizing done immediately prior to      

      procedure? Yes. Was patient positioned to in a way to prevent air embolism? Yes. Was        

      procedure site sterilized? Yes, with chlorhexidine. Was the site allowed to dry? Yes.       

      During the procedure, did the Practitioner(s) maintain a sterile field? Yes. Were           

      unused ports clamped during insertion? Yes. Was blood aspirated from each lumen? Yes.       

      After the procedure, did the Practitioner(s) clean the site and apply a sterile             

      dressing? Yes.                                                                              

21:53 Toney Alvarez DO is Hospitalizing Provider.                                           rn  

21:54 Skyler Pruitt MD is Hospitalizing Provider.                                           rn  

22:58 initiated a transfer with Rosario Cooper the House Supervisor from [facility].    [facility] 

22:59 administrative approval given by Rosario Cooper RN/ patient has been accepted to [address] ICU bed 192/ Dr. MCKAYLA Richter has accepted the patient in transfer/ report to       

      be called to [phone].                                                                    

12/10                                                                                             

02:39 Patient transferred, IV remains in place.                                               ca1 

                                                                                                  

Administered Medications:                                                                         

                                                                                             

18:40 Drug: NS 0.9% (30 ml/kg) 30 ml/kg Route: IV; Rate: bolus; Site: right antecubital;      hb  

20:00 Follow up: IV Status: Completed infusion; IV Intake: 2000ml                             ca1 

19:58 Drug: Cefepime 1 grams Route: IVPB; Rate: 200 ml/hr; Infused Over: 30 mins; Site: left  ca1 

      antecubital;                                                                                

20:30 Follow up: Response: No adverse reaction; IV Status: Completed infusion                 ca1 

20:00 Drug: NS 0.9% 500 ml Route: IV; Rate: bolus; Site: right antecubital;                   ca1 

20:30 Follow up: Response: No adverse reaction; IV Status: Completed infusion; IV Intake:     ca1 

      500ml                                                                                       

20:08 Drug: vancoMYCIN 1 grams Route: IVPB; Infused Over: 2 hrs; Site: right antecubital;     ca1 

22:00 Follow up: Response: No adverse reaction; IV Status: Completed infusion; IV Intake:     ca1 

      250ml                                                                                       

21:48 Drug: Levophed (4 mg/250 mL D5W 4 mcg/min Route: IV; Rate: calculated rate; Site: right ca1 

      femoral;                                                                                    

12/10                                                                                             

00:19 Follow up: IV Status: Infusion continued upon transfer                                  ca1 

                                                                                                  

                                                                                                  

Intake:                                                                                           

                                                                                             

20:00 IV: 2000ml; Total: 2000ml.                                                              ca1 

20:30 IV: 500ml; Total: 2500ml.                                                               ca1 

22:00 IV: 250ml; Total: 2750ml.                                                               ca1 

                                                                                                  

Output:                                                                                           

12/10                                                                                             

02:30 Urine: 500ml (Powell); Total: 500ml.                                                     ca1 

                                                                                                  

Outcome:                                                                                          

                                                                                             

21:53 Decision to Hospitalize by Provider.                                                    rn  

12/10                                                                                             

00:19 ER care complete, transfer ordered by MD.                                               rn  

02:39 Transferred by ground EMS Transfer form completed. X-rays sent w/ patient. Note:        ca1 

      [facility]                                                                    

02:39 Condition: stable                                                                           

02:39 Instructed on the need for transfer.                                                        

02:40 Patient left the ED.                                                                    ca1 

                                                                                                  

Signatures:                                                                                       

Dispatcher [employer]                           [facility]                                                 

Winifred Santamaria                                ds1                                                  

Yusef Pruitt MD MD rn Baxter, Heather, RN RN hb [place], MyKena                            mw2                                                  

Rocio Knight RN                        RN   ca1                                                  

                                                                                                  

Corrections: (The following items were deleted from the chart)                                    

                                                                                             

21:57 21:51 Reassessment: Patient appears in no apparent distress at this time. PT responded  ca1 

      to verbal commands. Opened eyes. ca1                                                        

12/10                                                                                             

00:04 00:04 Reassessment: Patient appears in no apparent distress at this time. No changes    ca1 

      from previously documented assessment. ca1                                                  

00:09 00:04 Neuro: Level of Consciousness is confused, lethargic, ca1                         ca1 

                                                                                                  

**************************************************************************************************

## 2020-12-09 NOTE — RAD REPORT
EXAM DESCRIPTION:  CT - [id number] - 12/9/2020 7:08 pm

 

CLINICAL HISTORY:  Trauma, head and neck injury.

ams

 

COMPARISON:  Soft Tissue Neck W/Contr dated 3/14/2018

 

TECHNIQUE:  Axial 5 mm thick images of the head were obtained.

 

Axial 2 mm thick images of the cervical spine were obtained with sagittal and coronal reconstruction 
images generated and reviewed.

 

All CT scans are performed using dose optimization technique as appropriate and may include automated
 exposure control or mA/KV adjustment according to patient size.

 

FINDINGS:  CT HEAD WITHOUT CONTRAST:

 

No acute hemorrhage, hydrocephalus or extra-axial collection is identified.Gliosis is seen in the dis
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 
midline shift.

 

The paranasal sinuses and mastoids are clear.The calvarium is intact.

 

CT CERVICAL SPINE WITHOUT CONTRAST:

 

No fracture or subluxation.Mild multilevel cervical degenerative changes.No prevertebral soft tissues
 swelling is identified.

 

IMPRESSION:  No acute intracranial or cervical spine findings.

## 2020-12-09 NOTE — RAD REPORT
EXAM DESCRIPTION:  RAD - Chest Single View - 12/9/2020 7:35 pm

 

CLINICAL HISTORY:  CONGESTION

Chest pain.

 

COMPARISON:  No comparisonsChest Single View dated 12/5/2019; Chest Single View dated 8/8/2016; CHEST
 SINGLE VIEW dated 1/14/2016; CHEST SINGLE VIEW dated 8/11/2014

 

FINDINGS:  Portable technique limits examination quality.

 

Mild interstitial prominence is seen. This could indicate viral pneumonitis or mild interstitial pulm
onary edema. The heart is normal in size. No displaced fractures.

## 2020-12-09 NOTE — EDPHYS
Physician Documentation                                                                           

 [facility]                                                                 

Name: Kailee Whitley                                                                             

Age: 65 yrs                                                                                       

Sex: Female                                                                                       

: 1955                                                                                   

MRN: [id number]                                                                                   

Arrival Date: 2020                                                                          

Time: 18:25                                                                                       

Account#: [id number]                                                                            

Bed 24                                                                                            

Private MD:                                                                                       

ED Physician Yusef Pruitt                                                                         

HPI:                                                                                              

                                                                                             

19:09 This 65 yrs old  Female presents to ER via EMS with complaints of Altered      rn  

      Mental Status.                                                                              

19:09 The patient presents with decreased responsiveness. Onset: The symptoms/episode         rn  

      began/occurred lunch time. Possible causes: unknown. Associated signs and symptoms:         

      Pertinent positives: confusion. Current symptoms: In the emergency department the           

      patient's symptoms are unchanged from the initial presentation. It is unknown whether       

      or not the patient has had similar symptoms in the past. The patient has not recently       

      seen a physician. Per reports, brought from nursing home for decreased responsiveness,      

      began around lunchtime with nausea. Given home meds, including abx for diverticulitis,      

      then given glucose without change. Pt responsive to pain. .                                 

                                                                                                  

Historical:                                                                                       

- Allergies:                                                                                      

18:33 No Known Allergies;                                                                     hb  

- PMHx:                                                                                           

18:33 acid reflux; CVA; Diverticulitis; Hyperlipidemia; TIA;                                  hb  

                                                                                                  

- Immunization history:: Adult Immunizations up to date.                                          

- Social history:: Smoking status: unknown.                                                       

- Family history:: not pertinent.                                                                 

- Hospitalizations: : No recent hospitalization is reported.                                      

- History obtained from: EMS.                                                                     

                                                                                                  

                                                                                                  

ROS:                                                                                              

19:09 Unable to obtain ROS due to altered mental status.                                      rn  

                                                                                                  

Exam:                                                                                             

19:12 Constitutional:  Agitated patient with decreased responsiveness Head/Face:              rn  

      Normocephalic, atraumatic. Eyes:  Periorbital areas with no swelling, redness, or           

      edema. ENT:  dry MM Cardiovascular:  Regular rate and rhythm.  No pulse deficits.           

      Respiratory:  No increased work of breathing, no retractions or nasal flaring.              

      Abdomen/GI:  soft, non-distended Skin:  Warm, dry MS/ Extremity:  Pulses equal, no          

      cyanosis. Neuro:  Somnolent, answers "yes" to some questions, responsive to painful         

      stimuli.                                                                                    

                                                                                                  

Vital Signs:                                                                                      

18:30 BP 70 / 42; Pulse 63; Resp 14; Temp 97.8; Pulse Ox 97% on R/A; Pain 0/10;               hb  

18:35 Weight 100 kg;                                                                          hb  

18:53 BP 67 / 54; Pulse 63; Resp 15; Pulse Ox 98% on R/A;                                     hb  

19:31 BP 75 / 45; Pulse 74; Resp 14 S; Pulse Ox 91% on R/A;                                   ca1 

19:45 BP 84 / 40; Pulse 69; Resp 14 S; Pulse Ox 90% on R/A;                                   ca1 

20:00 BP 87 / 56; Pulse 69; Resp 13 S; Pulse Ox 87% on R/A;                                   ca1 

20:01 Pulse Ox 95% on 2 lpm NC;                                                               ca1 

20:15 BP 91 / 59; Pulse 66; Resp 14 S; Pulse Ox 95% on R/A;                                   ca1 

20:30 BP 87 / 57; Pulse 68; Resp 14 S; Pulse Ox 94% on 2 lpm NC;                              ca1 

20:45 BP 86 / 53; Pulse 71; Resp 13 S; Pulse Ox 95% on R/A;                                   ca1 

21:00 BP 68 / 51; Pulse 70; Resp 13 S; Pulse Ox 96% on 2 lpm NC;                              ca1 

21:15 BP 85 / 59; Pulse 63; Resp 14 S; Pulse Ox 96% on 2 lpm NC;                              ca1 

21:30 BP 88 / 56; Pulse 69; Resp 14 S; Pulse Ox 96% on 2 lpm NC;                              ca1 

21:45  / 58; Pulse 67; Resp 14 S; Pulse Ox 96% on 2 lpm NC;                             ca1 

22:00  / 57; Pulse 67; Resp 14 S; Pulse Ox 96% on R/A;                                  ca1 

22:15  / 73; Pulse 63; Resp 15 S; Pulse Ox 96% on R/A;                                  ca1 

22:30  / 59; Pulse 65; Resp 16 S; Pulse Ox 96% on 2 lpm NC;                             ca1 

23:00  / 55; Pulse 77; Resp 15 S; Pulse Ox 100% on R/A;                                 ca1 

23:15  / 58; Pulse 67; Resp 14 S; Pulse Ox 99% on R/A;                                  ca1 

10                                                                                             

00:04  / 65; Pulse 74; Resp 16 S; Pulse Ox 97% on 2 lpm NC;                             ca1 

01:00  / 91; Pulse 73; Resp 18 S; Pulse Ox 100% on 2 lpm NC;                            ca1 

01:30  / 100; Pulse 73; Resp 15 S; Pulse Ox 100% on 2 lpm NC;                           ca1 

02:00 BP 90 / 60; Pulse 77; Resp 15 S; Pulse Ox 98% on 2 lpm NC;                              ca1 

02:30  / 60; Pulse 78; Resp 20 S; Pulse Ox 97% on R/A;                                  ca1 

                                                                                             

18:30 Acosta-Talha (FACES)                                                                      hb  

                                                                                                  

[place] Coma Score:                                                                               

                                                                                             

19:31 Eye Response: to pain(2). Verbal Response: incomprehensible(2). Motor Response:         ca1 

      withdraws from pain(4). Total: 8.                                                           

20:00 Eye Response: to pain(2). Verbal Response: incomprehensible(2). Motor Response:         ca1 

      withdraws from pain(4). Total: 8.                                                           

20:30 Eye Response: to pain(2). Verbal Response: incomprehensible(2). Motor Response:         ca1 

      withdraws from pain(4). Total: 8.                                                           

21:53 Eye Response: to voice(3). Verbal Response: inappropriate words(3). Motor Response:     ca1 

      localizes pain(5). Total: 11.                                                               

22:33 Eye Response: to voice(3). Verbal Response: confused(4). Motor Response: localizes      ca1 

      pain(5). Total: 12.                                                                         

12/10                                                                                             

00:09 Eye Response: to voice(3). Verbal Response: confused(4). Motor Response: obeys          ca1 

      commands(6). Total: 13.                                                                     

                                                                                                  

Procedures:                                                                                       

                                                                                             

21:34 Central Line: the site was prepped with Betadine, in sterile fashion, a triple lumen    rn  

      catheter was inserted, in the right in 1 attempts. placement was verified, by blood         

      return, the site was dressed with Tegaderm, using sterile technique, the patient            

      tolerated the procedure, well.                                                              

                                                                                                  

MDM:                                                                                              

19:01 Patient medically screened.                                                             rn  

21:34 ED course: Central line placed 2/2 persistent Hypotension despite 30cc/kg bolus. .      rn  

21:50 Differential Diagnosis: CVA, electrolyte abnormality, pneumonia, sepsis, UTI, volume    rn  

      depletion. Data reviewed: vital signs, nurses notes, lab test result(s), radiologic         

      studies, CT scan, and as a result, I will admit patient. Counseling: I had a detailed       

      discussion with the patient and/or guardian regarding: the historical points, exam          

      findings, and any diagnostic results supporting the discharge/admit diagnosis, lab          

      results, radiology results, the need for further work-up and treatment in the hospital.     

      Admission orders: after a detailed discussion of the patient's condition and case, the      

      admit orders are written by me. ED course: ICU bed available. Will admit. Abx started,      

      central line placed, improved BP, levophed started. .                                       

12/10                                                                                             

00:18 ED course: Gave report to Dr. Richter, agrees to see patient upon transfer. .            rn  

                                                                                                  

                                                                                             

18:35 Order name: Amylase, Serum                                                              ma2 

                                                                                             

18:35 Order name: Basic Metabolic Panel                                                       [facility] 

                                                                                             

18:35 Order name: Blood Culture Adult (2)                                                     ma2 

                                                                                             

18:35 Order name: CBC with Diff                                                               ma2 

                                                                                             

18:35 Order name: Ckmb                                                                        [facility] 

                                                                                             

18:35 Order name: CPK                                                                         [facility] 

                                                                                             

18:35 Order name: Lactate                                                                     [facility] 

                                                                                             

18:35 Order name: LFT's                                                                       [facility] 

                                                                                             

18:35 Order name: Lipase                                                                      [facility] 

                                                                                             

18:35 Order name: Procalcitonin                                                               [facility] 

                                                                                             

18:35 Order name: Protime (+inr)                                                              [facility] 

                                                                                             

18:35 Order name: Ptt, Activated                                                              [facility] 

                                                                                             

18:35 Order name: Troponin (emerg Dept Use Only)                                              [facility] 

                                                                                             

18:35 Order name: Urine Microscopic Only                                                      [facility] 

                                                                                             

18:36 Order name: Amylase                                                                     [facility]

                                                                                             

18:36 Order name: Basic Metabolic Panel                                                       [facility]

                                                                                             

18:36 Order name: Blood Culture                                                               [facility]

                                                                                             

18:48 Order name: Glucose, Ancillary Testing; Complete Time: 18:56                            [facility]

                                                                                             

18:56 Order name: COVID-19                                                                    [facility] 

                                                                                             

19:04 Order name: Lactate; Complete Time: 19:12                                               [facility]

                                                                                             

19:26 Order name: CORONAVIRUS                                                                 [facility]

                                                                                             

19:34 Order name: CBC with Automated Diff; Complete Time: 19:45                               [facility]

                                                                                             

19:37 Order name: Urine Dipstick--Ancillary (enter results)                                   [facility] 

                                                                                             

19:42 Order name: Protime (+INR); Complete Time: 19:45                                        [facility]

                                                                                             

19:42 Order name: PTT, Activated Partial Thromb; Complete Time: 19:45                         [facility]

                                                                                             

20:06 Order name: Procalcitonin; Complete Time: 20:10                                         [facility]

                                                                                             

20:10 Order name: SARS-COV-2 RT PCR; Complete Time: 20:10                                     [facility]

                                                                                             

20:18 Order name: Basic Metabolic Panel; Complete Time: 20:24                                 [facility]

                                                                                             

20:18 Order name: Liver (Hepatic) Function; Complete Time: 20:24                              [facility]

                                                                                             

20:18 Order name: Creatine Phosphokinase; Complete Time: 20:24                                [facility]

                                                                                             

18:35 Order name: Chest Single View XRAY; Complete Time: 19:45                                ma2 

                                                                                             

18:35 Order name: Accucheck; Complete Time: 18:41                                             ma2 

                                                                                             

18:35 Order name: Cardiac monitoring; Complete Time: 18:41                                    ma2 

                                                                                             

18:35 Order name: EKG - Nurse/Tech; Complete Time: 18:51                                      ma2 

                                                                                             

18:35 Order name: IV Saline Lock - Large Bore; Complete Time: 18:41                           ma2 

                                                                                             

18:35 Order name: Labs collected and sent; Complete Time: 18:46                               ma2 

                                                                                             

18:35 Order name: O2 Per Protocol; Complete Time: 18:41                                       ma2 

                                                                                             

18:35 Order name: O2 Sat Monitoring; Complete Time: 18:41                                     ma2 

                                                                                             

18:35 Order name: Urine Dipstick-Ancillary (obtain specimen); Complete Time: 19:36            ma2 

                                                                                             

18:35 Order name: CT Head C Spine; Complete Time: 19:33                                       ma2 

                                                                                             

20:34 Order name: Urine Dipstick-Ancillary; Complete Time: 20:46                              [facility]

                                                                                             

21:05 Order name: Urine Microscopic Only; Complete Time: 21:33                                [facility]

                                                                                             

21:54 Order name: CT Chest Abdomen Pelvis W/O Contrast                                        la1 

                                                                                                  

Administered Medications:                                                                         

                                                                                             

18:40 Drug: NS 0.9% (30 ml/kg) 30 ml/kg Route: IV; Rate: bolus; Site: right antecubital;      hb  

20:00 Follow up: IV Status: Completed infusion; IV Intake: 2000ml                             ca1 

19:58 Drug: Cefepime 1 grams Route: IVPB; Rate: 200 ml/hr; Infused Over: 30 mins; Site: left  ca1 

      antecubital;                                                                                

20:30 Follow up: Response: No adverse reaction; IV Status: Completed infusion                 ca1 

20:00 Drug: NS 0.9% 500 ml Route: IV; Rate: bolus; Site: right antecubital;                   ca1 

20:30 Follow up: Response: No adverse reaction; IV Status: Completed infusion; IV Intake:     ca1 

      500ml                                                                                       

20:08 Drug: vancoMYCIN 1 grams Route: IVPB; Infused Over: 2 hrs; Site: right antecubital;     ca1 

22:00 Follow up: Response: No adverse reaction; IV Status: Completed infusion; IV Intake:     ca1 

      250ml                                                                                       

21:48 Drug: Levophed (4 mg/250 mL D5W 4 mcg/min Route: IV; Rate: calculated rate; Site: right ca1 

      femoral;                                                                                    

12/10                                                                                             

00:19 Follow up: IV Status: Infusion continued upon transfer                                  ca1 

                                                                                                  

                                                                                                  

Disposition:                                                                                      

                                                                                             

21:50 Critical Care:.                                                                         rn  

                                                                                                  

Disposition:                                                                                      

12/10/20 00:19 Transfer ordered to Other Acute Care Facility. Diagnosis are Urinary tract         

  infection, site not specified, Altered mental status, unspecified, Sepsis,                      

  unspecified organism, Hypotension, unspecified, Acute kidney failure.                           

- Reason for transfer: Higher level of care.                                                      

- Accepting physician is Dr. Richter.                                                              

- Condition is Fair.                                                                              

- Problem is new.                                                                                 

- Symptoms have improved.                                                                         

                                                                                                  

                                                                                                  

                                                                                                  

Critical care time excluding procedures:                                                          

21:50 Critical care time: Bedside Care: 25 minutes, Consultation: 5 minutes. Total time: 30   rn  

      minutes                                                                                     

                                                                                                  

Signatures:                                                                                       

Dispatcher [employer]                           EDMS                                                 

Yusef Pruitt MD MD rn Attema, Lee, FNP-C                      FNP-Cla1                                                  

Harriet Barrera RN RN                                                      

Laquita Jones MD MD   ma2                                                  

Rocio Knight RN                        RN   ca1                                                  

                                                                                                  

Corrections: (The following items were deleted from the chart)                                    

19:46 19:12 Constitutional: Agitated patient with decreased responsiveness Head/Face:         rn  

      Normocephalic, atraumatic. Eyes: Periorbital areas with no swelling, redness, or edema.     

      ENT: dry MM Cardiovascular: Regular rate and rhythm. No pulse deficits. Respiratory: No     

      increased work of breathing, no retractions or nasal flaring. Abdomen/GI: soft, mild        

      lower abd tenderness Skin: Warm, dry MS/ Extremity: Pulses equal, no cyanosis. Neuro:       

      Somnolent, answers "yes" to some questions, responsive to painful stimuli. rn               

21:54 21:53 Hospitalization Ordered by Toney Alvarez DO for Inpatient Admission. Preliminary  rn  

      diagnosis is Urinary tract infection, site not specified; Sepsis, unspecified organism;     

      Hypotension, unspecified; Altered mental status, unspecified. Bed requested for             

      Intensive Care Unit. Status is Inpatient Admission. Condition is Fair. Problem is new.      

      Symptoms have improved. rn                                                                  

21:54 21:54 2020 21:53 Hospitalization Ordered by Skyler Pruitt MD for Inpatient        rn  

      Admission. Preliminary diagnosis is Urinary tract infection, site not specified;            

      Sepsis, unspecified organism; Hypotension, unspecified; Altered mental status,              

      unspecified. Bed requested for Intensive Care Unit. Status is Inpatient Admission.          

      Condition is Fair. Problem is new. Symptoms have improved. rn                               

12/10                                                                                             

00:18  21:54 2020 21:53 Hospitalization Ordered by Skyler Pruitt MD for Inpatient  rn  

      Admission. Preliminary diagnosis is Urinary tract infection, site not specified;            

      Sepsis, unspecified organism; Hypotension, unspecified; Altered mental status,              

      unspecified; Acute kidney failure. Bed requested for Intensive Care Unit. Status is         

      Inpatient Admission. Condition is Fair. Problem is new. Symptoms have improved. rn          

12/10                                                                                             

02:40 00:19 12/10/2020 00:19 Transfer ordered to Other Acute Care Facility. Diagnosis is      ca1 

      Urinary tract infection, site not specified; Altered mental status, unspecified;            

      Sepsis, unspecified organism; Hypotension, unspecified; Acute kidney failure. Reason        

      for transfer: Higher level of care. Accepting physician is Dr. Richter. Condition is         

      Fair. Problem is new. Symptoms have improved. rn                                            

                                                                                                  

**************************************************************************************************

## 2020-12-10 ENCOUNTER — HOSPITAL ENCOUNTER (INPATIENT)
Dept: HOSPITAL 88 - ICU | Age: 65
LOS: 5 days | Discharge: NURSING, REHAB OR LONG-TERM CARE | DRG: 871 | End: 2020-12-15
Attending: INTERNAL MEDICINE | Admitting: INTERNAL MEDICINE
Payer: MEDICARE

## 2020-12-10 VITALS — DIASTOLIC BLOOD PRESSURE: 50 MMHG | SYSTOLIC BLOOD PRESSURE: 141 MMHG

## 2020-12-10 VITALS — DIASTOLIC BLOOD PRESSURE: 73 MMHG | SYSTOLIC BLOOD PRESSURE: 110 MMHG

## 2020-12-10 VITALS — DIASTOLIC BLOOD PRESSURE: 59 MMHG | SYSTOLIC BLOOD PRESSURE: 104 MMHG

## 2020-12-10 VITALS — DIASTOLIC BLOOD PRESSURE: 41 MMHG | SYSTOLIC BLOOD PRESSURE: 95 MMHG

## 2020-12-10 VITALS — SYSTOLIC BLOOD PRESSURE: 104 MMHG | DIASTOLIC BLOOD PRESSURE: 50 MMHG

## 2020-12-10 VITALS — HEIGHT: 66 IN | BODY MASS INDEX: 36.16 KG/M2 | WEIGHT: 225 LBS

## 2020-12-10 VITALS — SYSTOLIC BLOOD PRESSURE: 110 MMHG | DIASTOLIC BLOOD PRESSURE: 73 MMHG

## 2020-12-10 VITALS — SYSTOLIC BLOOD PRESSURE: 104 MMHG | DIASTOLIC BLOOD PRESSURE: 59 MMHG

## 2020-12-10 VITALS — SYSTOLIC BLOOD PRESSURE: 93 MMHG | DIASTOLIC BLOOD PRESSURE: 58 MMHG

## 2020-12-10 VITALS — DIASTOLIC BLOOD PRESSURE: 66 MMHG | SYSTOLIC BLOOD PRESSURE: 99 MMHG

## 2020-12-10 VITALS — SYSTOLIC BLOOD PRESSURE: 104 MMHG | DIASTOLIC BLOOD PRESSURE: 48 MMHG

## 2020-12-10 VITALS — SYSTOLIC BLOOD PRESSURE: 100 MMHG | DIASTOLIC BLOOD PRESSURE: 56 MMHG

## 2020-12-10 VITALS — SYSTOLIC BLOOD PRESSURE: 103 MMHG | DIASTOLIC BLOOD PRESSURE: 73 MMHG

## 2020-12-10 VITALS — DIASTOLIC BLOOD PRESSURE: 50 MMHG | SYSTOLIC BLOOD PRESSURE: 104 MMHG

## 2020-12-10 VITALS — DIASTOLIC BLOOD PRESSURE: 83 MMHG | SYSTOLIC BLOOD PRESSURE: 94 MMHG

## 2020-12-10 DIAGNOSIS — R19.7: ICD-10-CM

## 2020-12-10 DIAGNOSIS — F32.9: ICD-10-CM

## 2020-12-10 DIAGNOSIS — N39.0: ICD-10-CM

## 2020-12-10 DIAGNOSIS — G93.41: ICD-10-CM

## 2020-12-10 DIAGNOSIS — I10: ICD-10-CM

## 2020-12-10 DIAGNOSIS — R65.20: ICD-10-CM

## 2020-12-10 DIAGNOSIS — Z20.828: ICD-10-CM

## 2020-12-10 DIAGNOSIS — A41.9: Primary | ICD-10-CM

## 2020-12-10 DIAGNOSIS — E66.9: ICD-10-CM

## 2020-12-10 DIAGNOSIS — I69.354: ICD-10-CM

## 2020-12-10 DIAGNOSIS — N17.9: ICD-10-CM

## 2020-12-10 DIAGNOSIS — J15.9: ICD-10-CM

## 2020-12-10 DIAGNOSIS — Z74.09: ICD-10-CM

## 2020-12-10 DIAGNOSIS — M24.59: ICD-10-CM

## 2020-12-10 DIAGNOSIS — E11.9: ICD-10-CM

## 2020-12-10 LAB
ANION GAP SERPL CALC-SCNC: 11 MMOL/L (ref 8–16)
BASOPHILS # BLD AUTO: 0 10*3/UL (ref 0–0.1)
BASOPHILS NFR BLD AUTO: 0.2 % (ref 0–1)
BUN SERPL-MCNC: 31 MG/DL (ref 7–26)
BUN/CREAT SERPL: 20 (ref 6–25)
CALCIUM SERPL-MCNC: 7.6 MG/DL (ref 8.4–10.2)
CHLORIDE SERPL-SCNC: 110 MMOL/L (ref 98–107)
CO2 SERPL-SCNC: 23 MMOL/L (ref 22–29)
DEPRECATED NEUTROPHILS # BLD AUTO: 11.6 10*3/UL (ref 2.1–6.9)
EGFRCR SERPLBLD CKD-EPI 2021: 33 ML/MIN (ref 60–?)
EOSINOPHIL # BLD AUTO: 0 10*3/UL (ref 0–0.4)
EOSINOPHIL NFR BLD AUTO: 0.1 % (ref 0–6)
ERYTHROCYTE [DISTWIDTH] IN CORD BLOOD: 14.3 % (ref 11.7–14.4)
GLUCOSE SERPLBLD-MCNC: 97 MG/DL (ref 74–118)
HCT VFR BLD AUTO: 33.6 % (ref 34.2–44.1)
HGB BLD-MCNC: 11 G/DL (ref 12–16)
LYMPHOCYTES # BLD: 2.2 10*3/UL (ref 1–3.2)
LYMPHOCYTES NFR BLD AUTO: 14.7 % (ref 18–39.1)
MCH RBC QN AUTO: 30.4 PG (ref 28–32)
MCHC RBC AUTO-ENTMCNC: 32.7 G/DL (ref 31–35)
MCV RBC AUTO: 92.8 FL (ref 81–99)
MONOCYTES # BLD AUTO: 1.1 10*3/UL (ref 0.2–0.8)
MONOCYTES NFR BLD AUTO: 7.1 % (ref 4.4–11.3)
NEUTS SEG NFR BLD AUTO: 77.4 % (ref 38.7–80)
PLATELET # BLD AUTO: 269 X10E3/UL (ref 140–360)
POTASSIUM SERPL-SCNC: 4 MMOL/L (ref 3.5–5.1)
RBC # BLD AUTO: 3.62 X10E6/UL (ref 3.6–5.1)
SODIUM SERPL-SCNC: 140 MMOL/L (ref 136–145)

## 2020-12-10 PROCEDURE — 0T9B70Z DRAINAGE OF BLADDER WITH DRAINAGE DEVICE, VIA NATURAL OR ARTIFICIAL OPENING: ICD-10-PCS

## 2020-12-10 PROCEDURE — 87040 BLOOD CULTURE FOR BACTERIA: CPT

## 2020-12-10 PROCEDURE — 71045 X-RAY EXAM CHEST 1 VIEW: CPT

## 2020-12-10 PROCEDURE — 71046 X-RAY EXAM CHEST 2 VIEWS: CPT

## 2020-12-10 PROCEDURE — 82948 REAGENT STRIP/BLOOD GLUCOSE: CPT

## 2020-12-10 PROCEDURE — 87086 URINE CULTURE/COLONY COUNT: CPT

## 2020-12-10 PROCEDURE — 36415 COLL VENOUS BLD VENIPUNCTURE: CPT

## 2020-12-10 PROCEDURE — 85025 COMPLETE CBC W/AUTO DIFF WBC: CPT

## 2020-12-10 PROCEDURE — 87493 C DIFF AMPLIFIED PROBE: CPT

## 2020-12-10 PROCEDURE — 05HD33Z INSERTION OF INFUSION DEVICE INTO RIGHT CEPHALIC VEIN, PERCUTANEOUS APPROACH: ICD-10-PCS

## 2020-12-10 PROCEDURE — 93306 TTE W/DOPPLER COMPLETE: CPT

## 2020-12-10 PROCEDURE — 83735 ASSAY OF MAGNESIUM: CPT

## 2020-12-10 PROCEDURE — 80048 BASIC METABOLIC PNL TOTAL CA: CPT

## 2020-12-10 PROCEDURE — 83605 ASSAY OF LACTIC ACID: CPT

## 2020-12-10 RX ADMIN — ROPINIROLE SCH MG: 1 TABLET, FILM COATED ORAL at 21:10

## 2020-12-10 RX ADMIN — FENOFIBRATE SCH MG: 145 TABLET ORAL at 21:10

## 2020-12-10 RX ADMIN — Medication SCH MG: at 11:55

## 2020-12-10 RX ADMIN — TRAZODONE HYDROCHLORIDE SCH MG: 50 TABLET ORAL at 21:10

## 2020-12-10 RX ADMIN — CEFEPIME HYDROCHLORIDE SCH MLS/HR: 1 INJECTION, POWDER, FOR SOLUTION INTRAMUSCULAR; INTRAVENOUS at 17:44

## 2020-12-10 NOTE — EKG
Test Date:    2020-12-09               Test Time:    18:48:38

Technician:   HB                                     

                                                     

MEASUREMENT RESULTS:                                       

Intervals:                                           

Rate:         61                                     

TN:           142                                    

QRSD:         84                                     

QT:           474                                    

QTc:          477                                    

Axis:                                                

P:            29                                     

TN:           142                                    

QRS:          47                                     

T:            63                                     

                                                     

INTERPRETIVE STATEMENTS:                                       

                                                     

Normal sinus rhythm

Normal ECG

Compared to ECG 08/09/2016 06:34:35

Sinus bradycardia no longer present



Electronically Signed On 12-10-20 10:56:28 CST by Victor Hugo Noel

## 2020-12-10 NOTE — RAD REPORT
EXAM DESCRIPTION:  CT - Chest Abd Pelvis Wo Con - 12/10/2020 6:56 am

 

CLINICAL HISTORY:  Sepsis, hypotension

 

COMPARISON:  None.

 

TECHNIQUE:  CT CHEST ABDOMEN PELVIS WITHOUT IV CONTRAST on 12/9/2020 9:54 PM CST

This exam was performed according to our departmental dose-optimization program, which includes autom
ated exposure control, adjustment of the mA and/or kV according to patient size and/or use of iterati
ve reconstruction technique.

 

FINDINGS:  Chest: The heart is normal in size. There is no pericardial effusion. Intrathoracic lymph 
nodes are not enlarged.   There is a right femoral venous line with the tip in the right external ayan
ac vein.

There is a minimal right pleural effusion. Central airways are patent. There is mild bibasilar atelec
tasis.

Abdomen: Liver is fatty in attenuation. There is no biliary dilatation. Cholecystectomy was performed
. The pancreas and spleen are normal in appearance. Adrenal glands are normal. Kidneys are mildly atr
ophic. 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

Abdominal aorta is normal in course and caliber without aneurysm. There is no free air. There is no r
etroperitoneal adenopathy.

Pelvis: There is mild left colonic diverticulosis. Urinary bladder is decompressed with a Powell christopher
ter. There is no free fluid. Hysterectomy was performed. Appendix is normal.

Skeleton: There are no acute osseous findings. No suspicious bony lesions.

 

IMPRESSION:  No definite acute findings.

 

Electronically signed by:   Donnell Gonzalez MD   12/9/2020 11:51 PM CST Workstation: [id number]

 

 

 

Due to temporary technical issues with the PACS/Fluency reporting system, reports are being signed by
 the in house radiologists without review as a courtesy to insure prompt reporting. The interpreting 
radiologist is fully responsible for the content of the report.

## 2020-12-11 VITALS — DIASTOLIC BLOOD PRESSURE: 53 MMHG | SYSTOLIC BLOOD PRESSURE: 94 MMHG

## 2020-12-11 VITALS — DIASTOLIC BLOOD PRESSURE: 65 MMHG | SYSTOLIC BLOOD PRESSURE: 111 MMHG

## 2020-12-11 VITALS — DIASTOLIC BLOOD PRESSURE: 65 MMHG | SYSTOLIC BLOOD PRESSURE: 128 MMHG

## 2020-12-11 VITALS — SYSTOLIC BLOOD PRESSURE: 118 MMHG | DIASTOLIC BLOOD PRESSURE: 76 MMHG

## 2020-12-11 VITALS — SYSTOLIC BLOOD PRESSURE: 167 MMHG | DIASTOLIC BLOOD PRESSURE: 65 MMHG

## 2020-12-11 VITALS — SYSTOLIC BLOOD PRESSURE: 102 MMHG | DIASTOLIC BLOOD PRESSURE: 70 MMHG

## 2020-12-11 LAB
ANION GAP SERPL CALC-SCNC: 11.4 MMOL/L (ref 8–16)
BASOPHILS # BLD AUTO: 0 10*3/UL (ref 0–0.1)
BASOPHILS NFR BLD AUTO: 0.3 % (ref 0–1)
BUN SERPL-MCNC: 12 MG/DL (ref 7–26)
BUN/CREAT SERPL: 15 (ref 6–25)
CALCIUM SERPL-MCNC: 8 MG/DL (ref 8.4–10.2)
CHLORIDE SERPL-SCNC: 108 MMOL/L (ref 98–107)
CO2 SERPL-SCNC: 24 MMOL/L (ref 22–29)
DEPRECATED NEUTROPHILS # BLD AUTO: 6.9 10*3/UL (ref 2.1–6.9)
EGFRCR SERPLBLD CKD-EPI 2021: > 60 ML/MIN (ref 60–?)
EOSINOPHIL # BLD AUTO: 0 10*3/UL (ref 0–0.4)
EOSINOPHIL NFR BLD AUTO: 0.3 % (ref 0–6)
ERYTHROCYTE [DISTWIDTH] IN CORD BLOOD: 14.5 % (ref 11.7–14.4)
GLUCOSE SERPLBLD-MCNC: 128 MG/DL (ref 74–118)
HCT VFR BLD AUTO: 34.9 % (ref 34.2–44.1)
HGB BLD-MCNC: 11.5 G/DL (ref 12–16)
LYMPHOCYTES # BLD: 4.3 10*3/UL (ref 1–3.2)
LYMPHOCYTES NFR BLD AUTO: 34.8 % (ref 18–39.1)
MCH RBC QN AUTO: 29.8 PG (ref 28–32)
MCHC RBC AUTO-ENTMCNC: 33 G/DL (ref 31–35)
MCV RBC AUTO: 90.4 FL (ref 81–99)
MONOCYTES # BLD AUTO: 1.1 10*3/UL (ref 0.2–0.8)
MONOCYTES NFR BLD AUTO: 8.7 % (ref 4.4–11.3)
NEUTS SEG NFR BLD AUTO: 55.3 % (ref 38.7–80)
PLATELET # BLD AUTO: 286 X10E3/UL (ref 140–360)
POTASSIUM SERPL-SCNC: 3.4 MMOL/L (ref 3.5–5.1)
RBC # BLD AUTO: 3.86 X10E6/UL (ref 3.6–5.1)
SODIUM SERPL-SCNC: 140 MMOL/L (ref 136–145)

## 2020-12-11 RX ADMIN — DULOXETINE HYDROCHLORIDE SCH MG: 30 CAPSULE, DELAYED RELEASE ORAL at 08:23

## 2020-12-11 RX ADMIN — Medication SCH MG: at 08:24

## 2020-12-11 RX ADMIN — CEFEPIME HYDROCHLORIDE SCH MLS/HR: 1 INJECTION, POWDER, FOR SOLUTION INTRAMUSCULAR; INTRAVENOUS at 06:20

## 2020-12-11 RX ADMIN — FENOFIBRATE SCH MG: 145 TABLET ORAL at 21:06

## 2020-12-11 RX ADMIN — PANTOPRAZOLE SODIUM SCH MG: 40 TABLET, DELAYED RELEASE ORAL at 08:24

## 2020-12-11 RX ADMIN — CEFEPIME HYDROCHLORIDE SCH MLS/HR: 1 INJECTION, POWDER, FOR SOLUTION INTRAMUSCULAR; INTRAVENOUS at 17:14

## 2020-12-11 RX ADMIN — Medication SCH MG: at 17:14

## 2020-12-11 RX ADMIN — OXYBUTYNIN CHLORIDE SCH MG: 5 TABLET, FILM COATED, EXTENDED RELEASE ORAL at 08:24

## 2020-12-11 RX ADMIN — Medication SCH MG: at 08:23

## 2020-12-11 RX ADMIN — TRAZODONE HYDROCHLORIDE SCH MG: 50 TABLET ORAL at 21:06

## 2020-12-11 RX ADMIN — ROPINIROLE SCH MG: 1 TABLET, FILM COATED ORAL at 21:06

## 2020-12-12 VITALS — DIASTOLIC BLOOD PRESSURE: 66 MMHG | SYSTOLIC BLOOD PRESSURE: 124 MMHG

## 2020-12-12 VITALS — DIASTOLIC BLOOD PRESSURE: 74 MMHG | SYSTOLIC BLOOD PRESSURE: 149 MMHG

## 2020-12-12 VITALS — SYSTOLIC BLOOD PRESSURE: 125 MMHG | DIASTOLIC BLOOD PRESSURE: 98 MMHG

## 2020-12-12 VITALS — SYSTOLIC BLOOD PRESSURE: 111 MMHG | DIASTOLIC BLOOD PRESSURE: 60 MMHG

## 2020-12-12 VITALS — DIASTOLIC BLOOD PRESSURE: 60 MMHG | SYSTOLIC BLOOD PRESSURE: 111 MMHG

## 2020-12-12 VITALS — DIASTOLIC BLOOD PRESSURE: 72 MMHG | SYSTOLIC BLOOD PRESSURE: 154 MMHG

## 2020-12-12 VITALS — DIASTOLIC BLOOD PRESSURE: 66 MMHG | SYSTOLIC BLOOD PRESSURE: 134 MMHG

## 2020-12-12 LAB
BASOPHILS # BLD AUTO: 0.1 10*3/UL (ref 0–0.1)
BASOPHILS NFR BLD AUTO: 0.4 % (ref 0–1)
DEPRECATED NEUTROPHILS # BLD AUTO: 7.4 10*3/UL (ref 2.1–6.9)
EOSINOPHIL # BLD AUTO: 0.1 10*3/UL (ref 0–0.4)
EOSINOPHIL NFR BLD AUTO: 0.4 % (ref 0–6)
ERYTHROCYTE [DISTWIDTH] IN CORD BLOOD: 14.4 % (ref 11.7–14.4)
HCT VFR BLD AUTO: 34.6 % (ref 34.2–44.1)
HGB BLD-MCNC: 11.6 G/DL (ref 12–16)
LYMPHOCYTES # BLD: 3.4 10*3/UL (ref 1–3.2)
LYMPHOCYTES NFR BLD AUTO: 28.5 % (ref 18–39.1)
MCH RBC QN AUTO: 30.6 PG (ref 28–32)
MCHC RBC AUTO-ENTMCNC: 33.5 G/DL (ref 31–35)
MCV RBC AUTO: 91.3 FL (ref 81–99)
MONOCYTES # BLD AUTO: 1 10*3/UL (ref 0.2–0.8)
MONOCYTES NFR BLD AUTO: 8.1 % (ref 4.4–11.3)
NEUTS SEG NFR BLD AUTO: 61.8 % (ref 38.7–80)
PLATELET # BLD AUTO: 312 X10E3/UL (ref 140–360)
RBC # BLD AUTO: 3.79 X10E6/UL (ref 3.6–5.1)

## 2020-12-12 RX ADMIN — CEFEPIME HYDROCHLORIDE SCH MLS/HR: 1 INJECTION, POWDER, FOR SOLUTION INTRAMUSCULAR; INTRAVENOUS at 05:02

## 2020-12-12 RX ADMIN — Medication SCH MG: at 08:03

## 2020-12-12 RX ADMIN — CEFEPIME HYDROCHLORIDE SCH MLS/HR: 1 INJECTION, POWDER, FOR SOLUTION INTRAMUSCULAR; INTRAVENOUS at 17:05

## 2020-12-12 RX ADMIN — Medication SCH MG: at 16:44

## 2020-12-12 RX ADMIN — LOPERAMIDE HYDROCHLORIDE PRN MG: 2 CAPSULE ORAL at 14:06

## 2020-12-12 RX ADMIN — DULOXETINE HYDROCHLORIDE SCH MG: 30 CAPSULE, DELAYED RELEASE ORAL at 08:03

## 2020-12-12 RX ADMIN — FENOFIBRATE SCH MG: 145 TABLET ORAL at 20:06

## 2020-12-12 RX ADMIN — OXYBUTYNIN CHLORIDE SCH MG: 5 TABLET, FILM COATED, EXTENDED RELEASE ORAL at 08:03

## 2020-12-12 RX ADMIN — LOPERAMIDE HYDROCHLORIDE PRN MG: 2 CAPSULE ORAL at 18:24

## 2020-12-12 RX ADMIN — TRAZODONE HYDROCHLORIDE SCH MG: 50 TABLET ORAL at 20:06

## 2020-12-12 RX ADMIN — PANTOPRAZOLE SODIUM SCH MG: 40 TABLET, DELAYED RELEASE ORAL at 08:04

## 2020-12-12 RX ADMIN — ROPINIROLE SCH MG: 1 TABLET, FILM COATED ORAL at 20:06

## 2020-12-13 VITALS — DIASTOLIC BLOOD PRESSURE: 77 MMHG | SYSTOLIC BLOOD PRESSURE: 144 MMHG

## 2020-12-13 VITALS — SYSTOLIC BLOOD PRESSURE: 140 MMHG | DIASTOLIC BLOOD PRESSURE: 88 MMHG

## 2020-12-13 VITALS — DIASTOLIC BLOOD PRESSURE: 79 MMHG | SYSTOLIC BLOOD PRESSURE: 132 MMHG

## 2020-12-13 VITALS — SYSTOLIC BLOOD PRESSURE: 158 MMHG | DIASTOLIC BLOOD PRESSURE: 73 MMHG

## 2020-12-13 VITALS — SYSTOLIC BLOOD PRESSURE: 141 MMHG | DIASTOLIC BLOOD PRESSURE: 85 MMHG

## 2020-12-13 VITALS — DIASTOLIC BLOOD PRESSURE: 73 MMHG | SYSTOLIC BLOOD PRESSURE: 158 MMHG

## 2020-12-13 VITALS — DIASTOLIC BLOOD PRESSURE: 70 MMHG | SYSTOLIC BLOOD PRESSURE: 145 MMHG

## 2020-12-13 LAB
ANION GAP SERPL CALC-SCNC: 10.8 MMOL/L (ref 8–16)
BUN SERPL-MCNC: 7 MG/DL (ref 7–26)
BUN/CREAT SERPL: 11 (ref 6–25)
CALCIUM SERPL-MCNC: 8.7 MG/DL (ref 8.4–10.2)
CHLORIDE SERPL-SCNC: 111 MMOL/L (ref 98–107)
CO2 SERPL-SCNC: 23 MMOL/L (ref 22–29)
EGFRCR SERPLBLD CKD-EPI 2021: > 60 ML/MIN (ref 60–?)
GLUCOSE SERPLBLD-MCNC: 118 MG/DL (ref 74–118)
POTASSIUM SERPL-SCNC: 3.8 MMOL/L (ref 3.5–5.1)
SODIUM SERPL-SCNC: 141 MMOL/L (ref 136–145)

## 2020-12-13 RX ADMIN — FENOFIBRATE SCH MG: 145 TABLET ORAL at 20:29

## 2020-12-13 RX ADMIN — Medication SCH MG: at 08:41

## 2020-12-13 RX ADMIN — PANTOPRAZOLE SODIUM SCH MG: 40 TABLET, DELAYED RELEASE ORAL at 08:41

## 2020-12-13 RX ADMIN — CEFEPIME HYDROCHLORIDE SCH MLS/HR: 1 INJECTION, POWDER, FOR SOLUTION INTRAMUSCULAR; INTRAVENOUS at 05:40

## 2020-12-13 RX ADMIN — CEFEPIME HYDROCHLORIDE SCH MLS/HR: 1 INJECTION, POWDER, FOR SOLUTION INTRAMUSCULAR; INTRAVENOUS at 17:00

## 2020-12-13 RX ADMIN — ROPINIROLE SCH MG: 1 TABLET, FILM COATED ORAL at 20:29

## 2020-12-13 RX ADMIN — ENOXAPARIN SODIUM SCH MG: 40 INJECTION SUBCUTANEOUS at 16:10

## 2020-12-13 RX ADMIN — OXYBUTYNIN CHLORIDE SCH MG: 5 TABLET, FILM COATED, EXTENDED RELEASE ORAL at 08:41

## 2020-12-13 RX ADMIN — Medication SCH MG: at 16:10

## 2020-12-13 RX ADMIN — TRAZODONE HYDROCHLORIDE SCH MG: 50 TABLET ORAL at 20:29

## 2020-12-13 RX ADMIN — DULOXETINE HYDROCHLORIDE SCH MG: 30 CAPSULE, DELAYED RELEASE ORAL at 08:41

## 2020-12-13 RX ADMIN — LOPERAMIDE HYDROCHLORIDE PRN MG: 2 CAPSULE ORAL at 08:41

## 2020-12-14 VITALS — SYSTOLIC BLOOD PRESSURE: 142 MMHG | DIASTOLIC BLOOD PRESSURE: 68 MMHG

## 2020-12-14 VITALS — SYSTOLIC BLOOD PRESSURE: 153 MMHG | DIASTOLIC BLOOD PRESSURE: 82 MMHG

## 2020-12-14 VITALS — SYSTOLIC BLOOD PRESSURE: 127 MMHG | DIASTOLIC BLOOD PRESSURE: 80 MMHG

## 2020-12-14 VITALS — DIASTOLIC BLOOD PRESSURE: 84 MMHG | SYSTOLIC BLOOD PRESSURE: 143 MMHG

## 2020-12-14 VITALS — DIASTOLIC BLOOD PRESSURE: 80 MMHG | SYSTOLIC BLOOD PRESSURE: 127 MMHG

## 2020-12-14 VITALS — DIASTOLIC BLOOD PRESSURE: 68 MMHG | SYSTOLIC BLOOD PRESSURE: 142 MMHG

## 2020-12-14 VITALS — DIASTOLIC BLOOD PRESSURE: 78 MMHG | SYSTOLIC BLOOD PRESSURE: 142 MMHG

## 2020-12-14 VITALS — DIASTOLIC BLOOD PRESSURE: 76 MMHG | SYSTOLIC BLOOD PRESSURE: 132 MMHG

## 2020-12-14 LAB
BASOPHILS # BLD AUTO: 0 10*3/UL (ref 0–0.1)
BASOPHILS NFR BLD AUTO: 0.3 % (ref 0–1)
DEPRECATED NEUTROPHILS # BLD AUTO: 6.4 10*3/UL (ref 2.1–6.9)
EOSINOPHIL # BLD AUTO: 0.1 10*3/UL (ref 0–0.4)
EOSINOPHIL NFR BLD AUTO: 1.2 % (ref 0–6)
ERYTHROCYTE [DISTWIDTH] IN CORD BLOOD: 14.3 % (ref 11.7–14.4)
HCT VFR BLD AUTO: 35.8 % (ref 34.2–44.1)
HGB BLD-MCNC: 12 G/DL (ref 12–16)
LYMPHOCYTES # BLD: 2.1 10*3/UL (ref 1–3.2)
LYMPHOCYTES NFR BLD AUTO: 22.2 % (ref 18–39.1)
MCH RBC QN AUTO: 30.8 PG (ref 28–32)
MCHC RBC AUTO-ENTMCNC: 33.5 G/DL (ref 31–35)
MCV RBC AUTO: 92 FL (ref 81–99)
MONOCYTES # BLD AUTO: 0.8 10*3/UL (ref 0.2–0.8)
MONOCYTES NFR BLD AUTO: 8.6 % (ref 4.4–11.3)
NEUTS SEG NFR BLD AUTO: 66.9 % (ref 38.7–80)
PLATELET # BLD AUTO: 298 X10E3/UL (ref 140–360)
RBC # BLD AUTO: 3.89 X10E6/UL (ref 3.6–5.1)

## 2020-12-14 RX ADMIN — Medication SCH MG: at 08:49

## 2020-12-14 RX ADMIN — ENOXAPARIN SODIUM SCH MG: 40 INJECTION SUBCUTANEOUS at 17:04

## 2020-12-14 RX ADMIN — FENOFIBRATE SCH MG: 145 TABLET ORAL at 20:56

## 2020-12-14 RX ADMIN — ASPIRIN SCH MG: 81 TABLET, COATED ORAL at 08:50

## 2020-12-14 RX ADMIN — OXYBUTYNIN CHLORIDE SCH MG: 5 TABLET, FILM COATED, EXTENDED RELEASE ORAL at 08:50

## 2020-12-14 RX ADMIN — ROPINIROLE SCH MG: 1 TABLET, FILM COATED ORAL at 20:56

## 2020-12-14 RX ADMIN — PANTOPRAZOLE SODIUM SCH MG: 40 TABLET, DELAYED RELEASE ORAL at 08:49

## 2020-12-14 RX ADMIN — CEFEPIME HYDROCHLORIDE SCH MLS/HR: 1 INJECTION, POWDER, FOR SOLUTION INTRAMUSCULAR; INTRAVENOUS at 18:23

## 2020-12-14 RX ADMIN — Medication SCH MG: at 17:04

## 2020-12-14 RX ADMIN — CEFEPIME HYDROCHLORIDE SCH MLS/HR: 1 INJECTION, POWDER, FOR SOLUTION INTRAMUSCULAR; INTRAVENOUS at 05:32

## 2020-12-14 RX ADMIN — TRAZODONE HYDROCHLORIDE SCH MG: 50 TABLET ORAL at 20:55

## 2020-12-14 RX ADMIN — DULOXETINE HYDROCHLORIDE SCH MG: 30 CAPSULE, DELAYED RELEASE ORAL at 08:50

## 2020-12-15 VITALS — SYSTOLIC BLOOD PRESSURE: 130 MMHG | DIASTOLIC BLOOD PRESSURE: 70 MMHG

## 2020-12-15 VITALS — SYSTOLIC BLOOD PRESSURE: 151 MMHG | DIASTOLIC BLOOD PRESSURE: 81 MMHG

## 2020-12-15 VITALS — DIASTOLIC BLOOD PRESSURE: 61 MMHG | SYSTOLIC BLOOD PRESSURE: 143 MMHG

## 2020-12-15 VITALS — DIASTOLIC BLOOD PRESSURE: 60 MMHG | SYSTOLIC BLOOD PRESSURE: 114 MMHG | OXYGEN SATURATION: 97 %

## 2020-12-15 VITALS — DIASTOLIC BLOOD PRESSURE: 81 MMHG | SYSTOLIC BLOOD PRESSURE: 151 MMHG

## 2020-12-15 RX ADMIN — DULOXETINE HYDROCHLORIDE SCH MG: 30 CAPSULE, DELAYED RELEASE ORAL at 08:59

## 2020-12-15 RX ADMIN — PANTOPRAZOLE SODIUM SCH MG: 40 TABLET, DELAYED RELEASE ORAL at 09:00

## 2020-12-15 RX ADMIN — ASPIRIN SCH MG: 81 TABLET, COATED ORAL at 08:59

## 2020-12-15 RX ADMIN — OXYBUTYNIN CHLORIDE SCH MG: 5 TABLET, FILM COATED, EXTENDED RELEASE ORAL at 09:00

## 2020-12-15 RX ADMIN — Medication SCH MG: at 09:00

## 2020-12-15 RX ADMIN — Medication SCH MG: at 08:59

## 2020-12-15 RX ADMIN — CEFEPIME HYDROCHLORIDE SCH MLS/HR: 1 INJECTION, POWDER, FOR SOLUTION INTRAMUSCULAR; INTRAVENOUS at 06:15

## 2022-09-09 ENCOUNTER — HOSPITAL ENCOUNTER (EMERGENCY)
Dept: HOSPITAL 97 - ER | Age: 67
Discharge: HOME | End: 2022-09-09
Payer: COMMERCIAL

## 2022-09-09 VITALS — DIASTOLIC BLOOD PRESSURE: 82 MMHG | SYSTOLIC BLOOD PRESSURE: 147 MMHG | OXYGEN SATURATION: 95 %

## 2022-09-09 DIAGNOSIS — Z86.73: ICD-10-CM

## 2022-09-09 DIAGNOSIS — R00.1: ICD-10-CM
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
## 2022-09-09 DIAGNOSIS — I10: ICD-10-CM

## 2022-09-09 DIAGNOSIS — E11.9: ICD-10-CM

## 2022-09-09 DIAGNOSIS — S40.012A: Primary | ICD-10-CM

## 2022-09-09 DIAGNOSIS — S09.90XA: ICD-10-CM

## 2022-09-09 LAB
ALBUMIN SERPL BCP-MCNC: 3.4 G/DL (ref 3.4–5)
ALP SERPL-CCNC: 52 U/L (ref 45–117)
ALT SERPL W P-5'-P-CCNC: 35 U/L (ref 12–78)
AST SERPL W P-5'-P-CCNC: 24 U/L (ref 15–37)
BUN BLD-MCNC: 13 MG/DL (ref 7–18)
GLUCOSE SERPLBLD-MCNC: 169 MG/DL (ref 74–106)
HCT VFR BLD CALC: 40 % (ref 36–45)
INR BLD: 0.97
LYMPHOCYTES # SPEC AUTO: 3 K/UL (ref 0.7–4.9)
MAGNESIUM SERPL-MCNC: 1.8 MG/DL (ref 1.8–2.4)
MCV RBC: 88.4 FL (ref 80–100)
NT-PROBNP SERPL-MCNC: 70 PG/ML (ref ?–125)
PMV BLD: 8.4 FL (ref 7.6–11.3)
POTASSIUM SERPL-SCNC: 3.8 MMOL/L (ref 3.5–5.1)
RBC # BLD: 4.53 M/UL (ref 3.86–4.86)
TROPONIN I SERPL HS-MCNC: 7.4 PG/ML (ref ?–58.9)

## 2022-09-09 PROCEDURE — 71045 X-RAY EXAM CHEST 1 VIEW: CPT

## 2022-09-09 PROCEDURE — 87088 URINE BACTERIA CULTURE: CPT

## 2022-09-09 PROCEDURE — 86900 BLOOD TYPING SEROLOGIC ABO: CPT

## 2022-09-09 PROCEDURE — 86901 BLOOD TYPING SEROLOGIC RH(D): CPT

## 2022-09-09 PROCEDURE — 83735 ASSAY OF MAGNESIUM: CPT

## 2022-09-09 PROCEDURE — 85610 PROTHROMBIN TIME: CPT

## 2022-09-09 PROCEDURE — 80076 HEPATIC FUNCTION PANEL: CPT

## 2022-09-09 PROCEDURE — 72125 CT NECK SPINE W/O DYE: CPT

## 2022-09-09 PROCEDURE — 73502 X-RAY EXAM HIP UNI 2-3 VIEWS: CPT

## 2022-09-09 PROCEDURE — 70450 CT HEAD/BRAIN W/O DYE: CPT

## 2022-09-09 PROCEDURE — 81003 URINALYSIS AUTO W/O SCOPE: CPT

## 2022-09-09 PROCEDURE — 87186 SC STD MICRODIL/AGAR DIL: CPT

## 2022-09-09 PROCEDURE — 87086 URINE CULTURE/COLONY COUNT: CPT

## 2022-09-09 PROCEDURE — 86850 RBC ANTIBODY SCREEN: CPT

## 2022-09-09 PROCEDURE — 80048 BASIC METABOLIC PNL TOTAL CA: CPT

## 2022-09-09 PROCEDURE — 85025 COMPLETE CBC W/AUTO DIFF WBC: CPT

## 2022-09-09 PROCEDURE — 96374 THER/PROPH/DIAG INJ IV PUSH: CPT

## 2022-09-09 PROCEDURE — 87077 CULTURE AEROBIC IDENTIFY: CPT

## 2022-09-09 PROCEDURE — 93005 ELECTROCARDIOGRAM TRACING: CPT

## 2022-09-09 PROCEDURE — 96361 HYDRATE IV INFUSION ADD-ON: CPT

## 2022-09-09 PROCEDURE — 96375 TX/PRO/DX INJ NEW DRUG ADDON: CPT

## 2022-09-09 PROCEDURE — 36415 COLL VENOUS BLD VENIPUNCTURE: CPT

## 2022-09-09 PROCEDURE — 71250 CT THORAX DX C-: CPT

## 2022-09-09 PROCEDURE — 83880 ASSAY OF NATRIURETIC PEPTIDE: CPT

## 2022-09-09 PROCEDURE — 51702 INSERT TEMP BLADDER CATH: CPT

## 2022-09-09 PROCEDURE — 84484 ASSAY OF TROPONIN QUANT: CPT

## 2022-09-09 PROCEDURE — 99285 EMERGENCY DEPT VISIT HI MDM: CPT

## 2022-09-09 PROCEDURE — 73030 X-RAY EXAM OF SHOULDER: CPT

## 2022-09-09 NOTE — RAD REPORT
EXAM DESCRIPTION:  CT - Head C Spine Cap Wo Con - 9/9/2022 8:29 am

 

CLINICAL HISTORY:  Trauma, head and neck injury.  Chest, abdomen and pelvis pain.

fall

 

COMPARISON:  Head Brain W/Wo Con dated 5/9/2022

 

TECHNIQUE:  CT head without contrast.

 

CT cervical spine without contrast with coronal and sagittal reformatted images.

 

CT chest, abdomen and pelvis without contrast with coronal and sagittal reformatted images of the spi
ne.

 

All CT scans are performed using dose optimization technique as appropriate and may include automated
 exposure control or mA/KV adjustment according to patient size.

 

FINDINGS:  CT HEAD WITHOUT CONTRAST:

 

No intracranial hemorrhage, hydrocephalus or extra-axial fluid collection.  Area of gliosis is seen r
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

 

The paranasal sinuses and mastoids are clear. The calvarium is intact.

 

 

CT CERVICAL SPINE WITHOUT CONTRAST:

 

No fracture or subluxation. Mild-to-moderate spondylosis of the cervical spine noted. The prevertebra
l soft tissues are normal in thickness.

 

 

CT CHEST, ABDOMEN, PELVIS WITHOUT CONTRAST:

 

NOTE: Lack of contrast is a significant limitation in the assessment of trauma related findings. Spec
ifically, solid organ, vascular and bowel evaluation is significantly limited.

 

The lungs are clear.No pneumothorax or pericardial/pleural fluid.

 

No evidence of intra-abdominal visceral injury, free fluid or free air is seen within the above detai
led limitations.

 

No concerning pelvic findings.

 

No fractures.

 

IMPRESSION:  Negative for acute traumatic findings within the above detailed limitations.

## 2022-09-09 NOTE — RAD REPORT
EXAM DESCRIPTION:  RAD - Chest Single View - 9/9/2022 10:42 am

 

CLINICAL HISTORY:  COUGH

Chest pain.

 

COMPARISON:  Chest Single View dated 12/9/2020; Chest Single View dated 12/5/2019; Chest Single View 
dated 8/8/2016; CHEST SINGLE VIEW dated 1/14/2016

 

FINDINGS:  Portable technique limits examination quality.

 

The lungs are mildly emphysematous but grossly clear. The heart is normal in size. No displaced fract
ures.

 

IMPRESSION:  No acute intrathoracic process suspected. Mild COPD.

## 2022-09-09 NOTE — XMS REPORT
Continuity of Care Document

                          Created on:2022



Patient:ALEK LAM

Sex:Female

:1955

External Reference #:[id number]





Demographics







                          Address                   [address] [place] [address]

 

                          Home Phone                [phone]

 

                          Email Address             NONE

 

                          Preferred Language        Unknown

 

                          Marital Status            Unknown

 

                          Oriental orthodox Affiliation     Unknown

 

                          Race                      Unknown

 

                          Additional Race(s)        White

 

                          Ethnic Group              Unknown









Author







                          Organization              [facility]

t

 

                          Address                   [address]



                                                    [place] [address]

 

                          Phone                     [phone]









Support







                Name            Relationship    Address         Phone

 

                ADAM AGGARWAL  Unavailable     1939 - 3        Unavailable



                                                [place] [address] 









Care Team Providers







                    Name                Role                Phone

 

                    NONSTAFF            Primary Care Physician Unavailable

 

                    MARINO BETANCOURT     Attending Clinician Unavailable

 

                    MARINO BETANCOURT     Admitting Clinician Unavailable









Payers







           Payer Name Policy Type Policy Number Effective Date Expiration Date S

ource

 

           United                [id number]  2019            [facility]                       00:00:00              Patient



           [facility]

 

           Medicare A & B            [id number] 2010            [facility]



                                            00:00:00              Patient



                                                                  Medical



                                                                  Center







Problems







       Condition Condition Condition Status Onset  Resolution Last   Treating Co

mments 

Source



       Name   Details Category        Date   Date   Treatment Clinician        



                                                 Date                 

 

       Problem        Condition Active                                    [facility]







Allergies, Adverse Reactions, Alerts







       Allergy Allergy Status Severity Reaction(s) Onset  Inactive Treating Comm

ents 

Source



       Name   Type                        Date   Date   Clinician        

 

       No Known DA     Active                                           [facility]







Social History







           Social Habit Start Date Stop Date  Quantity   Comments   Source

 

           Sex Assigned At 1955 Female                [facility]



           Birth      00:00:00   00:00:00                         Patient Medica

l



                                                                  Center







Medications







       Ordered Filled Start  Stop   Current Ordering Indication Dosage Frequency

 Signature

                    Comments            Components          Source



     Medication Medication Date Date Medication? Clinician                (SIG) 

          



     Name Name                                                   

 

     Calcium Calcium           Yes            1         Twice A           [facility]



     Carbonate/V Carbonate/V                                    Day for         

  [facility]



     itamin D3 itamin D3                                    Osteoporos          

 Patient



     (Calcium (Calcium                                    is             Medical



     500 + Vit D 500 + Vit D                                                   C

enter



     200 Tablet) 200 Tablet)                                                   



     1 Each 1 Each                                                   



     TABLET TABLET                                                   

 

     Docusate Docusate           Yes            2         Daily as           [facility]



     Sodium Sodium                                    needed for           [facility]



                                                  Constipati           Patient



                                                  on             Medical



                                                                 Center

 

     Duloxetine Duloxetine           Yes            60        Daily for         

  [facility]



     Hcl  Hcl                                     Major           [facility]



     (Cymbalta) (Cymbalta)                                    Depressive        

   Patient



     30 Mg 30 Mg                                    Disorder           Medical



     CAPSULE.DR CAPSULE.DR Lucero ramirez

 

     Fenofibrate Fenofibrate           Yes            145       Bedtime         

  [facility]



     (Tricor) (Tricor)                                    for            [facility]



     145 Mg  Mg TAB                                    Hyperlipid        

   Patient



                                                  emia           [facility]

 

     Furosemide Furosemide           Yes            20        Daily for         

  [facility]



     (Lasix) 20 (Lasix) 20                                    Chronic           

[facility]



     Mg TABLET Mg TABLET                                    Ischemic           P

atient



                                                  [facility]



                                                  Disease           Center

 

     Levofloxaci Levofloxaci           Yes            500       Daily for       

    [facility]



     n    n                                       Diverticul           [facility]



                                                  itis           Patient



                                                                 Medical



                                                                 Center

 

     Loratadine Loratadine           Yes            10        Daily for         

  [facility]



                                                  Seasonal           [facility]



                                                  Allergies           Patient



                                                                 Medical



                                                                 Center

 

     Metronidazo Metronidazo           Yes            1         Every 8         

  [facility]



     le (Flagyl) le (Flagyl)                                    Hours for       

    [facility]



     500 Mg 500 Mg                                    Diverticul           Patie

nt



     TABLET TABLET                                    [facility]

 

     Omeprazole Omeprazole           Yes            20        Daily for         

  [facility]



                                                  Gerd           [facility]



                                                                 [facility]



                                                                 [facility]



                                                                 Center

 

     Oxybutynin Oxybutynin           Yes            5         Daily for         

  [facility]



     Chloride Chloride                                    Stress           [facility]



     (Ditropan (Ditropan                                    Incontinen          

 Patient



     Xl) 5 Mg Xl) 5 Mg                                    ce             Medical



     TAB.ER.24 TAB.ER.24                                                   Cente

r

 

     Potassium Potassium           Yes            10        Daily for           

[facility]



     Chloride Chloride                                    Chronic           [facility]

s



                                                  Ischemic           Patient



                                                  [facility]



                                                  Disease           Center

 

     Promethazin Promethazin           Yes            25        Every 8         

  [facility]



     e Hcl e Hcl                                    Hours as           [facility]



     (Phenergan) (Phenergan)                                    needed for      

     Patient



     25 Mg/1 Ml 25 Mg/1 Ml                                    Nausea And        

   Medical



     AMPUL AMPUL                                    Vomiting           Center

 

     Propranolol Propranolol           Yes            40        Twice A         

  [facility]



     Hcl  Hcl                                     Day for           [facility]



                                                  Primary           Patient



                                                  Hypertensi           Medical



                                                  on             Center

 

     Ropinirole Ropinirole           Yes                      Bedtime           

[facility]



     Hcl  Hcl                                     for            [facility]



                                                  Restless           Patient



                                                  Leg            Medical



                                                  Syndrome           Center

 

     Sennosides Sennosides           Yes            8.6       Every 12          

 [facility]



     (Senna Lax) (Senna Lax)                                    Hours as        

   [facility]



     8.6 Mg 8.6 Mg                                    needed for           Patie

nt



     TABLET TABLET                                    Constipati           Medic

al



                                                  on             Center

 

     Trazodone Trazodone           Yes            1         Bedtime           [facility]

I [facility]



     Hcl  Hcl                                     for            [facility]



                                                  Insomnia           Patient



                                                                 Medical



                                                                 Center

 

     Baclofen Baclofen           Yes            10        Three           [facility]



                                                  Times A           [facility]



                                                  Day for           Patient



                                                  Muscle           Medical



                                                  Spasm           Center







Vital Signs







             Vital Name   Observation Time Observation Value Comments     Source

 

             Body Temperature 2020-12-15 08:49:00 98.0 [degF]               [facility]



                                                                 Patient Medical



                                                                 Center

 

             Heart Rate   2020-12-15 08:49:00 89 /min                   [facility]

 

             Respiratory rate 2020-12-15 08:49:00 18 /min                   [facility]

 

             BP Systolic  2020-12-15 08:49:00 151 mm[Hg]                [facility]

 

             BP Diastolic 2020-12-15 08:49:00 81 mm[Hg]                 [facility]

 

             Oxygen saturation by 2020-12-15 08:49:00 98 /min                   

[facility]



             Pulse oximetry                                        Patient Medic

al



                                                                 Center

 

             BMI (Body Mass Index) 2020-12-10 10:44:00 36.3 kg/m2               

 [facility]

 

             Weight       2020-12-10 04:39:00 225 [lb_av]               [facility]







Procedures







                Procedure       Date / Time Performed Performing Clinician [facility]

e

 

                X-ray of chest, two 2020 00:00:00                 [facility]







Encounters







        Start   End     Encounter Admission Attending Care    Care    Encounter 

Source



        Date/Time Date/Time Type    Type    Clinicians Facility Department ID   

   

 

        2020-12-10         Inpatient                 [facility]  [id number] 

[facility]



        00:06:00                                                 [id number] [facility]

 

        2020-12-10 2020-12-15 Discharged 1       BETANCOURT, [facility]'s 

[address] [facility]



        04:17:00 12:05:00 Inpatient         [facility]         Patients 16      [facility]







Results







           Test Description Test Time  Test Comments Results    Result Comments 

Source









                          Capillary blood glucose measurement by glucometer (mas

s/volume) 2020-12-15 

10:32:00                                









                      Test Item  Value      Reference Range Interpretation Comme

nts









             Bedside Glucose (test code = 00893-3) 107 mg/dL              

          



[facility] by 
glucometer (mass/volume)2020-12-15 10:32:00





             Test Item    Value        Reference Range Interpretation Comments

 

             Bedside Glucose (test code = 107 mg/dL                       

 



             46745-4)                                            



[facility] HospitalFlu[facility] procedure less than one hour 
[id number] 16:15:00





             Test Item    Value        Reference Range Interpretation Comments

 

             Coronavirus (PCR) (test code = NOT DETECTED NOTDETECTED            

   



             Coronavirus (PCR))                                        



SARS-COV2/RT-PCR CEPHEIDResults are for the detection of SARS-COV-2 RNA. The 
SARS-COV-2 RNA is generally detectable in nasopharyngeal swab specimens during 
the acute phase of infection. Positive results are indicitive of active 
infection with SARS-COV-2; clinical correlation with patient history and other 
diagnostic information is necessary to determine patient infection status. 
Positive results do not rule out bacterial infection or co-infection with other 
viruses. The agent detected may not be thedefinite cause of the disease.The 
limit of detection for this assay is 250 copies/mLThe SARS-CoV-2 test is a 
rapid, real-time RT-PCR test intended for the qualitative detection of nucleic 
acid from SARS-CoV-2 in nasopharyngeal swab specimen collected from individuals 
suspected of COVID-19 by their healthcare provider. This test has not been Food 
and Drug Administration (FDA) cleared or approved and has been authorized by FDA
 under an Emergency Use Authorization (EUA). This EUA will be effective until 
the declaration that circumstances exist justifying the authorization of the 
emergency use of in vitro diagnostic test for detection and or diagnosis of 
COVID-19 is terminated under section 564(b) of the Act, or the the EUA is 
revoked under 564(g) of the ACT.[facility] HospitalFluoroscopic 
procedure less than one hour [id number] 16:15:00





             Test Item    Value        Reference Range Interpretation Comments

 

             Coronavirus (PCR) (test code = NOT DETECTED NOTDETECTED            

   



             Coronavirus (PCR))                                        



SARS-COV2/RT-PCR CEPHEIDResults are for the detection of SARS-COV-2 RNA. The 
SARS-COV-2 RNA is generally detectable in nasopharyngeal swab specimens during 
the acute phase of infection. Positive results are indicitive of active 
infection with SARS-COV-2; clinical correlation with patient history and other 
diagnostic information is necessary to determine patient infection status. 
Positive results do not rule out bacterial infection or co-infection with other 
viruses. The agent detected may not be thedefinite cause of the disease.The 
limit of detection for this assay is 250 copies/mLThe SARS-CoV-2 test is a 
rapid, real-time RT-PCR test intended for the qualitative detection of nucleic 
acid from SARS-CoV-2 in nasopharyngeal swab specimen collected from individuals 
suspected of COVID-19 by their healthcare provider. This test has not been Food 
and Drug Administration (FDA) cleared or approved and has been authorized by FDA
 under an Emergency Use Authorization (EUA). This EUA will be effective until 
the declaration that circumstances exist justifying the authorization of the 
emergency use of in vitro diagnostic test for detection and or diagnosis of 
COVID-19 is terminated under section 564(b) of the Act, or the the EUA is 
revoked under 564(g) of the ACT.[facility] HospitalCHES 2 VIEWS
2020 11:47:00
************************************************************[facility] : 1955 Sex: 
F************************************************************  [place] [address] Patient Name: ALEK LAM MR #: [id number] : 1955 Age/Sex: 
65/F Acct #: [id number] Req #: [id number] Adm Physician: MARINO BETANCOURT MD  
Ordered by: MARINO BETANCOURT MD Report #: [id number] [facility]: MED[facility] Room/Bed:
 [facility] ____________________________________________________________________
_______________________________ Procedure: [id number] DX/CHEST 2 VIEWS Exam Date:
 20 Exam Time:1115  REPORT STATUS: Signed EXAMINATION: CHEST 2 VIEWS 
INDICATION: Pneumonia COMPARISON: Chest radiograph 12/10/2020 FINDINGS: 
LINES/TUBES:None LUNGS:The lungs are well-inflated. No focal consolidationor 
pulmonary edema. PLEURA:No pleural effusion or pneumothorax. MEDIASTINUM:The 
cardiomediastinal silhouette appears normal in size and shape. BONES/SOFT 
TISSUES:No acute osseous injury. ABDOMEN:No free air under the diaphragm. 
IMPRESSION: No focal pneumonia or pulmonary edema. Signed by: Rodrigo Dalton MD on 
2020 11:48 AM Dictated By: RODRIGO DALTON MD Electronically Signed By: RODRIGO DALTON MD on 20 1148 Transcribed By: ARON on 20 1148 COPY TO: 
MARINO BETANCOURT MDBlood leukocytes automated count (number/volume)2020 
05:24:00





             Test Item    Value        Reference Range Interpretation Comments

 

             White Blood Count (test code = 9.55 10*3/uL 4.8-10.8               

   



             6690-2)                                             



[facility] HospitalBlood erythrocytes automated count 
(number/volume)2020 05:24:00





             Test Item    Value        Reference Range Interpretation Comments

 

             Red Blood Count (test code = 3.89 10*6/mL 3.6-5.1                  

 



             789-8)                                              



[facility] HospitalBlood hemoglobin measurement (moles/volume)
2020 05:24:00





             Test Item    Value        Reference Range Interpretation Comments

 

             Hemoglobin (test code = 86957-8) 12.0 g/dL    12.0-16.0            

     



[facility] HospitalAutomated blood hematocrit (volume fraction)
2020 05:24:00





             Test Item    Value        Reference Range Interpretation Comments

 

             Hematocrit (test code = 4544-3) 35.8 %       34.2-44.1             

    



[facility] HospitalAutomated erythrocyte mean corpuscular volume
2020 05:24:00





             Test Item    Value        Reference Range Interpretation Comments

 

             Mean Corpuscular Volume (test code = 92.0         81-99            

         



             787-2)                                              



[facility] HospitalAutomated erythrocyte mean corpuscular 
hemoglobin (mass per erythrocyte)2020 05:24:00





             Test Item    Value        Reference Range Interpretation Comments

 

             Mean Corpuscular Hemoglobin (test 30.8 pg      28-32               

      



             code = 785-6)                                        



[facility] HospitalAutomated erythrocyte mean corpuscular 
hemoglobin concentration measurement (mass/volume)2020 05:24:00





             Test Item    Value        Reference Range Interpretation Comments

 

             Mean Corpuscular Hemoglobin Concent 33.5 g/dL    31-35             

        



             (test code = 786-4)                                        



[facility] HospitalRDW [id number]-[id number] 05:24:00





             Test Item    Value        Reference Range Interpretation Comments

 

             Red Cell Distribution Width (test code 14.3 %       11.7-14.4      

           



             = 48242-5)                                          



[facility] HospitalAutomated blood platelet count (count/volume)
2020 05:24:00





             Test Item    Value        Reference Range Interpretation Comments

 

             Platelet Count (test code = 298 10*3/uL  140-360                   



             777-3)                                              



[facility] HospitalAutomated blood segmented neutrophil count as
 percentage of total [id number] 05:24:00





             Test Item    Value        Reference Range Interpretation Comments

 

             Neutrophils (%) (Auto) (test code = 66.9 %       38.7-80.0         

        



             25648-2)                                            



[facility] HospitalAutomated blood lymphocyte count as 
percentage ot total myjiplxeoi3674-12-83 05:24:00





             Test Item    Value        Reference Range Interpretation Comments

 

             Lymphocytes (%) (Auto) (test code = 22.2 %       18.0-39.1         

        



             736-9)                                              



[facility] HospitalAutomated blood monocyte count as percentage 
of total [id number] 05:24:00





             Test Item    Value        Reference Range Interpretation Comments

 

             Monocytes (%) (Auto) (test code = 8.6 %        4.4-11.3            

      



             5905-5)                                             



[facility] HospitalAutomated blood eosinophil count as 
percentage of total [id number] 05:24:00





             Test Item    Value        Reference Range Interpretation Comments

 

             Eosinophils (%) (Auto) (test code = 1.2 %        0.0-6.0           

        



             713-8)                                              



[facility] HospitalAutomated blood basophil count as percentage 
of total rktxxaxqqp6358-49-56 05:24:00





             Test Item    Value        Reference Range Interpretation Comments

 

             Basophils (%) (Auto) (test code = 0.3 %        0.0-1.0             

      



             706-2)                                              



[facility] HospitalFluoroscopic procedure less than one hour 
[id number] 05:24:00





             Test Item    Value        Reference Range Interpretation Comments

 

             IM GRANULOCYTES % (test code = IM 0.8 %        0.0-1.0             

      



             GRANULOCYTES %)                                        



[facility] HospitalAutomated blood neutrophil [id number] 
05:24:00





             Test Item    Value        Reference Range Interpretation Comments

 

             Neutrophils # (Auto) (test code = 6.4          2.1-6.9             

      



             751-8)                                              



[facility] HospitalBlood lymphocytes count (number/volume)
2020 05:24:00





             Test Item    Value        Reference Range Interpretation Comments

 

             Lymphocytes # (Auto) (test code = 2.1          1.0-3.2             

      



             05147-9)                                            



[facility] monocytes automated count 
(number/volume)2020 05:24:00





             Test Item    Value        Reference Range Interpretation Comments

 

             Monocytes # (Auto) (test code = 742-7) 0.8          0.2-0.8        

           



[facility] eosinophil [id number] 
05:24:00





             Test Item    Value        Reference Range Interpretation Comments

 

             Eosinophils # (Auto) (test code = 0.1          0.0-0.4             

      



             711-2)                                              



[facility] basophil count (count/volume)
2020 05:24:00





             Test Item    Value        Reference Range Interpretation Comments

 

             Basophils # (Auto) (test code = 704-7) 0.0          0.0-0.1        

           



[facility] HospitalFluoroscopic procedure less than one hour 
fbayjqcu3730-40-79 05:24:00





             Test Item    Value        Reference Range Interpretation Comments

 

             Absolute Immature Granulocyte 0.08 10*3/uL 0-0.1                   

  



             (auto (test code = Absolute                                        



             Immature Granulocyte (auto)                                        



[facility] leukocytes automated count 
(number/volume)2020 05:24:00





             Test Item    Value        Reference Range Interpretation Comments

 

             White Blood Count (test code = 9.55 10*3/uL 4.8-10.8               

   



             6690-2)                                             



[facility] automated count 
(number/volume)2020 05:24:00





             Test Item    Value        Reference Range Interpretation Comments

 

             Red Blood Count (test code = 3.89 10*6/mL 3.6-5.1                  

 



             789-8)                                              



[facility] HospitalBlood hemoglobin measurement (moles/volume)
2020 05:24:00





             Test Item    Value        Reference Range Interpretation Comments

 

             Hemoglobin (test code = 67864-5) 12.0 g/dL    12.0-16.0            

     



[facility] HospitalAutom[facility] blood hematocrit (volume fraction)
2020 05:24:00





             Test Item    Value        Reference Range Interpretation Comments

 

             Hematocrit (test code = 4544-3) 35.8 %       34.2-44.1             

    



[facility] HospitalAutomated erythrocyte mean corpuscular volume
2020 05:24:00





             Test Item    Value        Reference Range Interpretation Comments

 

             Mean Corpuscular Volume (test code = 92.0         81-99            

         



             787-2)                                              



[facility] HospitalAutom[facility] erythrocyte mean corpuscular 
hemoglobin (mass per erythrocyte)2020 05:24:00





             Test Item    Value        Reference Range Interpretation Comments

 

             Mean Corpuscular Hemoglobin (test 30.8 pg      28-32               

      



             code = 785-6)                                        



[facility] HospitalAutom[facility] erythrocyte mean corpuscular 
hemoglobin concentration measurement (mass/volume)2020 05:24:00





             Test Item    Value        Reference Range Interpretation Comments

 

             Mean Corpuscular Hemoglobin Concent 33.5 g/dL    31-35             

        



             (test code = 786-4)                                        



[facility] HospitalRDW YcwJa-Ryd0714-41-14 05:24:00





             Test Item    Value        Reference Range Interpretation Comments

 

             Red Cell Distribution Width (test code 14.3 %       11.7-14.4      

           



             = 96305-5)                                          



[facility] HospitalAutom[facility] blood platelet count (count/volume)
2020 05:24:00





             Test Item    Value        Reference Range Interpretation Comments

 

             Platelet Count (test code = 298 10*3/uL  140-360                   



             777-3)                                              



[facility] as
 percentage of total vtjowcopwz9892-29-84 05:24:00





             Test Item    Value        Reference Range Interpretation Comments

 

             Neutrophils (%) (Auto) (test code = 66.9 %       38.7-80.0         

        



             19838-9)                                            



[facility] HospitalAutom[facility] lymphocyte count as 
percentage ot total wqcahacthe7930-66-39 05:24:00





             Test Item    Value        Reference Range Interpretation Comments

 

             Lymphocytes (%) (Auto) (test code = 22.2 %       18.0-39.1         

        



             736-9)                                              



[facility] HospitalAutom[facility] blood monocyte count as percentage 
of total ifapqcgojq8260-01-45 05:24:00





             Test Item    Value        Reference Range Interpretation Comments

 

             Monocytes (%) (Auto) (test code = 8.6 %        4.4-11.3            

      



             5905-5)                                             



[facility] HospitalAutom[facility] blood eosinophil count as 
percentage of total rjualspfnh2254-08-74 05:24:00





             Test Item    Value        Reference Range Interpretation Comments

 

             Eosinophils (%) (Auto) (test code = 1.2 %        0.0-6.0           

        



             713-8)                                              



[facility] HospitalAutom[facility] blood basophil count as percentage 
of total mqjpqgbbxr4558-73-94 05:24:00





             Test Item    Value        Reference Range Interpretation Comments

 

             Basophils (%) (Auto) (test code = 0.3 %        0.0-1.0             

      



             706-2)                                              



[facility] HospitalFluoroscopic procedure less than one hour 
siznocnd9462-88-39 05:24:00





             Test Item    Value        Reference Range Interpretation Comments

 

             IM GRANULOCYTES % (test code = IM 0.8 %        0.0-1.0             

      



             GRANULOCYTES %)                                        



[facility] HospitalAutom[facility] blood neutrophil kimmq3941-62-95 
05:24:00





             Test Item    Value        Reference Range Interpretation Comments

 

             Neutrophils # (Auto) (test code = 6.4          2.1-6.9             

      



             751-8)                                              



[facility] HospitalBlood lymphocytes count (number/volume)
2020 05:24:00





             Test Item    Value        Reference Range Interpretation Comments

 

             Lymphocytes # (Auto) (test code = 2.1          1.0-3.2             

      



             79222-8)                                            



[facility] monocytes automated count 
(number/volume)2020 05:24:00





             Test Item    Value        Reference Range Interpretation Comments

 

             Monocytes # (Auto) (test code = 742-7) 0.8          0.2-0.8        

           



[facility] HospitalAutom[facility] blood eosinophil bapwb6383-89-82 
05:24:00





             Test Item    Value        Reference Range Interpretation Comments

 

             Eosinophils # (Auto) (test code = 0.1          0.0-0.4             

      



             711-2)                                              



[facility] HospitalAutom[facility] blood basophil count (count/volume)
2020 05:24:00





             Test Item    Value        Reference Range Interpretation Comments

 

             Basophils # (Auto) (test code = 704-7) 0.0          0.0-0.1        

           



[facility] HospitalFluoroscopic procedure less than one hour 
cfhdirsw3892-18-47 05:24:00





             Test Item    Value        Reference Range Interpretation Comments

 

             Absolute Immature Granulocyte 0.08 10*3/uL 0-0.1                   

  



             (auto (test code = Absolute                                        



             Immature Granulocyte (auto)                                        



[facility] sodium [facility] 
(moles/volume)2020 06:10:00





             Test Item    Value        Reference Range Interpretation Comments

 

             Sodium Level (test code = 2951-2) 141 mmol/L   136-145             

      



[facility] potassium measurement 
(moles/volume)2020 06:10:00





             Test Item    Value        Reference Range Interpretation Comments

 

             Potassium Level (test code = 3.8 mmol/L   3.5-5.1                  

 



             2823-3)                                             



[facility] chloride measurement 
(moles/volume)2020 06:10:00





             Test Item    Value        Reference Range Interpretation Comments

 

             Chloride Level (test code = 111 mmol/L                       



             5-0)                                             



[facility] carbon dioxide, total 
measurement (moles/volume)2020 06:10:00





             Test Item    Value        Reference Range Interpretation Comments

 

             Carbon Dioxide Level (test code = 23 mmol/L    2028-)                                             



[facility] plasma anion hlc8979-94-75 06:10:00





             Test Item    Value        Reference Range Interpretation Comments

 

             Anion Gap (test code = 33037-3) 10.8 mmol/L  8-16                  

    



[facility] urea nitrogen measurement 
(mass/volume)2020 06:10:00





             Test Item    Value        Reference Range Interpretation Comments

 

             Blood Urea Nitrogen (test code = 7 mg/dL      7-26                 

     



             3094-0)                                             



[facility] creatinine measurement 
(mass/volume)2020 06:10:00





             Test Item    Value        Reference Range Interpretation Comments

 

             Creatinine (test code = 2160-0) 0.66 mg/dL   0.57-1.11             

    



[facility] urea nitrogen/creatinine mass
 znmil5278-73-30 06:10:00





             Test Item    Value        Reference Range Interpretation Comments

 

             BUN/Creatinine Ratio (test code = 11           6-25                

      



             3097-3)                                             



[facility] HospitalEstimated glomerular filtration rate (GFR) 
rjbjjthhhtwru4888-57-57 06:10:00





             Test Item    Value        Reference Range Interpretation Comments

 

             Estimat Glomerular Filtration > 60 mL/min  >60                     

  



             Rate (test code = 146585010)                                       

 



Ranges were taken from the National Kidney Disease Education Program and the 
National Kid[employer] Foundation literature.Reference ranges:60 or greater: Jdusjm91-
59 (for 3 consecutive months): Chronic kidneydisease 15 or less: Kidney failure
[facility] HospitalGlucose hmnuvccezzo3935-05-83 06:10:00





             Test Item    Value        Reference Range Interpretation Comments

 

             Glucose Level (test code = YFM2312) 118 mg/dL                

        



[facility] calcium measurement 
(mass/volume)2020 06:10:00





             Test Item    Value        Reference Range Interpretation Comments

 

             Calcium Level (test code = 60769-4) 8.7 mg/dL    8.4-10.2          

        



[facility] HospitalFluoroscopic procedure less than one hour 
jafexnop6195-89-45 06:10:00





             Test Item    Value        Reference Range Interpretation Comments

 

             Lactic Acid Level (test code = 0.7 mmol/L   0.5-2.0                

   



             Lactic Acid Level)                                        



[facility] sodium measurement 
(moles/volume)2020 06:10:00





             Test Item    Value        Reference Range Interpretation Comments

 

             Sodium Level (test code = 2951-2) 141 mmol/L   136-145             

      



[facility] potassium measurement 
(moles/volume)2020 06:10:00





             Test Item    Value        Reference Range Interpretation Comments

 

             Potassium Level (test code = 3.8 mmol/L   3.5-5.1                  

 



             2823-3)                                             



[facility] chloride measurement 
(moles/volume)2020 06:10:00





             Test Item    Value        Reference Range Interpretation Comments

 

             Chloride Level (test code = 111 mmol/L                       



             5-0)                                             



[facility]rum [facility] plasma carbon dioxide, total 
measurement (moles/volume)2020 06:10:00





             Test Item    Value        Reference Range Interpretation Comments

 

             Carbon Dioxide Level (test code = 23 mmol/L    2028)                                             



[facility]rum or plasma anion [id number] 06:10:00





             Test Item    Value        Reference Range Interpretation Comments

 

             Anion Gap (test code = 33037-3) 10.8 mmol/L  8-16                  

    



[facility] plasma urea nitrogen measurement 
(mass/volume)2020 06:10:00





             Test Item    Value        Reference Range Interpretation Comments

 

             Blood Urea Nitrogen (test code = 7 mg/dL                       

     



             3094-0)                                             



[facility] creatinine measurement 
(mass/volume)2020 06:10:00





             Test Item    Value        Reference Range Interpretation Comments

 

             Creatinine (test code = 2160-0) 0.66 mg/dL   0.57-1.11             

    



[facility] urea nitrogen/creatinine mass
 nowly0852-58-08 06:10:00





             Test Item    Value        Reference Range Interpretation Comments

 

             BUN/Creatinine Ratio (test code = 11                           

      



             3097-3)                                             



[facility] HospitalEstimated glomerular filtration rate (GFR) 
ehdydxgufaqnj7339-20-93 06:10:00





             Test Item    Value        Reference Range Interpretation Comments

 

             Estimat Glomerular Filtration > 60 mL/min  >60                     

  



             Rate (test code = 824222909)                                       

 



Ranges were taken from the National Kidney Disease Education Program and the 
National Kid[employer] Foundation literature.Reference ranges:60 or greater: Iwzrxp76-
59 (for 3 consecutive months): Chronic kidneydisease 15 or less: Kidney failure
[facility] HospitalGlucose qjlcntdxvji0628-06-98 06:10:00





             Test Item    Value        Reference Range Interpretation Comments

 

             Glucose Level (test code = PTR1895) 118 mg/dL                

        



[facility] calcium measurement 
(mass/volume)2020 06:10:00





             Test Item    Value        Reference Range Interpretation Comments

 

             Calcium Level (test code = 67789-6) 8.7 mg/dL    8.4-10.2          

        



[facility] HospitalFluoroscopic procedure less than one hour 
fuljxfik0670-80-31 06:10:00





             Test Item    Value        Reference Range Interpretation Comments

 

             Lactic Acid Level (test code = 0.7 mmol/L   0.5-2.0                

   



             Lactic Acid Level)                                        



[facility] HospitalClostridium difficile A and B toxin assay
2020 17:26:00





             Test Item    Value        Reference Range Interpretation Comments

 

             Clostridium Difficile Toxin A & B NEGATIVE     NEGATIVE            

      



             (test code = 550739190)                                        



Testing on stool aspirate specimens is outside  claims since 
specimen type not validatedon this assay.[facility]
Clostridium difficile A and B toxin [id number] 17:26:00





             Test Item    Value        Reference Range Interpretation Comments

 

             Clostridium Difficile Toxin A & B NEGATIVE     NEGATIVE            

      



             (test code = 420294515)                                        



Testing on stool aspirate specimens is outside  claims since 
specimen type not validatedon this assay.[facility]
Blood bvexivx6734-06-35 16:40:00





             Test Item    Value        Reference Range Interpretation Comments

 

             Blood Culture (test NO GROWTH AFTER 48                           



             code = 71392988) HOURS                                  



[facility] HospitalBlood wnmrana5764-10-35 16:40:00





             Test Item    Value        Reference Range Interpretation Comments

 

             Blood Culture (test NO GROWTH AFTER 5                           



             code = 35690982) DAYS, FINAL REPORT                           



[facility] magnesium measurement 
(mass/volume)2020 15:10:00





             Test Item    Value        Reference Range Interpretation Comments

 

             Magnesium Level (test code = 1.4 mg/dL    1.3-2.1                  

 



             85506-9)                                            



[facility] magnesium measurement 
(mass/volume)2020 15:10:00





             Test Item    Value        Reference Range Interpretation Comments

 

             Magnesium Level (test code = 1.4 mg/dL    1.3-2.1                  

 



             20188-3)                                            



[facility] HospitalCHE[facility]INGLE (PORTABLE)2020-12-10 21:56:00
************************************************************[facility] : 1955 Sex: 
F************************************************************  [facility] [address] [address] Patient Name: ALEK LAM MR #: [id number] : 1955 Age/Sex: 
65/F Acct #: [id number] Req #: [id number] [facility] Physician: MARINO BETANCOURT MD 
Ordered by: MARINO BETANCOURT MD Report #: [id number] Location: MED/SURG3  
Room/Bed: [facility] 
____________________________________________________________________
_______________________________ Procedure: [id number] DX/CHEST SINGLE (PORTABLE) 
Exam Date: 12/10/20 Exam Time:  REPORT STATUS: Signed EXAMINATION: CHEST 
SINGLE (PORTABLE) INDICATION: SOB 2020 COMPARISON: None FINDINGS: 
Patient rotation mildly limits examination. Heart is nonenlarged. Low lung 
volumes. Mild elevation of the right hemidiaphragm. Patchy and linear lung 
opacities at the right base. Left lung is clear. No pneumothorax. No pleural 
effusion. IMPRESSION: Mild elevation of the right hemidiaphragm with patchy and 
strandy right basilar opacities which could represent atelectasis or pneumonia 
the appropriate clinical setting. Mild diffuse interstitial densities may 
represent a component of vascular congestion. Signed by: Janeen Ramirez MD on 
12/10/2020 9:59 PM Dictated By: TED RAMIREZ MD Electronically Signed By: JANEEN RAMIREZ MD on 12/10/20 2159 Transcribed By: ARON on 12/10/20 2159 COPY TO: 
MARINO BETANCOURT MD

## 2022-09-09 NOTE — EDPHYS
Physician Documentation                                                                           

 [facility]                                                                 

Name: Kailee Whitley                                                                             

Age: 67 yrs                                                                                       

Sex: Female                                                                                       

: 1955                                                                                   

MRN: [id number]                                                                                   

Arrival Date: 2022                                                                          

Time: 08:07                                                                                       

Account#: [id number]                                                                            

Bed 6                                                                                             

Private MD:                                                                                       

ED Physician Sergio Tellez                                                                      

HPI:                                                                                              

                                                                                             

10:10 This 67 yrs old  Female presents to ER via EMS with complaints of Fall Injury. linda 

10:10 Details of fall: The patient fell from a height, from an upright position, while        linda 

      walking. Onset: The symptoms/episode began/occurred just prior to arrival. Associated       

      injuries: The patient sustained injury to the head, contusion, pain, injury to the          

      chest, specifically the xiphoid area. Severity of symptoms: At their worst the symptoms     

      were moderate, in the emergency department the symptoms have improved, mildly. The          

      patient has not experienced similar symptoms in the past.                                   

                                                                                                  

Historical:                                                                                       

- Allergies:                                                                                      

08:16 No Known Allergies;                                                                     tp1 

- Home Meds:                                                                                      

08:37 tylenol [Active]; aspirin 325 mg Oral tab 1 tab once daily [Active]; baclofen 10 mg     tp1 

      Oral tab 1 tab 3 times per day [Active]; Buspirone Oral [Active]; docusate sodium 250       

      mg Oral cap 1 cap once daily [Active]; duloxetine 60 mg Oral cpDR 2 caps once daily         

      [Active]; Gemtesa oral [Active]; Glimepiride Oral [Active]; latanoprost [Active];           

      Lipitor Oral [Active]; Lyrica 75 mg Oral 1 cap 3 times per day [Active]; multivitamin       

      Oral [Active]; Norco  mg Oral tab 1 tab three times a day [Active]; omeprazole 20     

      mg Oral TbEC 20 mg every 8 hours [Active]; oscal [Active]; ropinrole hcl 0.25 mg 1 tab      

      nightly [Active]; senna 8.6 mg Oral cap 2 caps two times a day [Active]; trazodone 50       

      mg Oral tab 1 tab at bedtime [Active]; Tricor Oral [Active];                                

- PMHx:                                                                                           

08:16 acid reflux; Diverticulitis; Hyperlipidemia; CVA; TIA; Diabetes mellitus; Osteoporosis; tp1 

      cranial nerve disorder; left sided weakness; restless leg syndrome; Glaucoma; cerebral      

      infarction; Anxiety; Hypertensive disorder; GERD; Depressive disorder;                      

                                                                                                  

- Immunization history:: Client reports receiving the 2nd dose of the Covid vaccine.              

- Social history:: Smoking status: Patient reports the use of cigarette tobacco                   

  products, smokes one pack cigarettes per day.                                                   

- Immunization history: Last tetanus immunization: unknown.                                       

- Family history:: not pertinent.                                                                 

                                                                                                  

                                                                                                  

ROS:                                                                                              

10:10 Constitutional: Negative for fever, chills, and weight loss, Eyes: Negative for injury, linda 

      pain, redness, and discharge, ENT: Negative for injury, pain, and discharge, Neck:          

      Negative for injury, pain, and swelling, Cardiovascular: Negative for chest pain,           

      palpitations, and edema, Respiratory: Negative for shortness of breath, cough,              

      wheezing, and pleuritic chest pain, Abdomen/GI: Negative for abdominal pain, nausea,        

      vomiting, diarrhea, and constipation, Back: Negative for injury and pain, : Negative      

      for injury, bleeding, discharge, and swelling, Skin: Negative for injury, rash, and         

      discoloration, Neuro: Negative for headache, weakness, numbness, tingling, and seizure,     

      Psych: Negative for depression, anxiety, suicide ideation, homicidal ideation, and          

      hallucinations, Allergy/Immunology: Negative for hives, rash, and allergies, Endocrine:     

      Negative for neck swelling, polydipsia, polyuria, polyphagia, and marked weight             

      changes, Hematologic/Lymphatic: Negative for swollen nodes, abnormal bleeding, and          

      unusual bruising.                                                                           

10:10 MS/extremity: Positive for decreased range of motion, pain, of the xiphoid area, left       

      shoulder.                                                                                   

                                                                                                  

Exam:                                                                                             

10:14 Constitutional:  This is a well developed, well nourished patient who is awake, alert,  linda 

      and in no acute distress. Head/Face:  Normocephalic, atraumatic. Eyes:  Pupils equal        

      round and reactive to light, extra-ocular motions intact.  Lids and lashes normal.          

      Conjunctiva and sclera are non-icteric and not injected.  Cornea within normal limits.      

      Periorbital areas with no swelling, redness, or edema. ENT:  Nares patent. No nasal         

      discharge, no septal abnormalities noted.  Tympanic membranes are normal and external       

      auditory canals are clear.  Oropharynx with no redness, swelling, or masses, exudates,      

      or evidence of obstruction, uvula midline.  Mucous membranes moist. Chest/axilla:           

      Normal chest wall appearance and motion.  Nontender with no deformity.  No lesions are      

      appreciated. Cardiovascular:  Regular rate and rhythm with a normal S1 and S2.  No          

      gallops, murmurs, or rubs.  Normal PMI, no JVD.  No pulse deficits. Respiratory:  Lungs     

      have equal breath sounds bilaterally, clear to auscultation and percussion.  No rales,      

      rhonchi or wheezes noted.  No increased work of breathing, no retractions or nasal          

      flaring. Abdomen/GI:  Soft, non-tender, with normal bowel sounds.  No distension or         

      tympany.  No guarding or rebound.  No evidence of tenderness throughout. Back:  No          

      spinal tenderness.  No costovertebral tenderness.  Full range of motion. Female :         

      Normal external genitalia. Skin:  Warm, dry with normal turgor.  Normal color with no       

      rashes, no lesions, and no evidence of cellulitis. Neuro:  Awake and alert, GCS 15,         

      oriented to person, place, time, and situation.  Cranial nerves II-XII grossly intact.      

      Motor strength 5/5 in all extremities.  Sensory grossly intact.  Cerebellar exam            

      normal.  Normal gait. Psych:  Awake, alert, with orientation to person, place and time.     

       Behavior, mood, and affect are within normal limits.                                       

10:14 Neck: External neck: is normal, no acute changes, C-spine: C-collar placed PTA,             

      Trachea: is midline with no obvious abnormalities, no acute changes, ROM/movement:          

      pain, that is mild, Meningeal signs: are not present, Kernig's sign is negative,            

      Brudzinski's sign is negative, Lymph nodes: no appreciated lymphadenopathy.                 

10:14 ECG was reviewed by the Attending Physician.                                                

                                                                                                  

Vital Signs:                                                                                      

08:00  / 69; Pulse 57; Resp 16; Temp 98.1; Pulse Ox 91% ; Weight 80.29 kg; Height 5 ft. tp1 

      1 in. (154.94 cm); Pain 10/10;                                                              

09:00 Pulse 45;                                                                               tp1 

09:36  / 84; Pulse 55; Resp 16; Pulse Ox 96% on R/A;                                    tp1 

10:00  / 92; Pulse 55; Resp 15; Pulse Ox 96% on R/A;                                    tp1 

10:30  / 89; Pulse 52; Resp 16; Pulse Ox 96% on R/A;                                    tp1 

11:00  / 73; Pulse 53; Resp 16; Pulse Ox 96% on R/A;                                    tp1 

11:30  / 84; Pulse 59; Resp 17; Pulse Ox 95% on R/A;                                    tp1 

12:00  / 93; Pulse 61; Resp 12; Pulse Ox 93% on R/A;                                    tp1 

12:30  / 82; Pulse 55; Resp 17; Pulse Ox 95% on R/A;                                    vg1 

08:00 Body Mass Index 33.44 (80.29 kg, 154.94 cm)                                             tp1 

09:00 provider notified                                                                       tp1 

                                                                                                  

Camryn Coma Score:                                                                               

08:00 Eye Response: spontaneous(4). Verbal Response: oriented(5). Motor Response: obeys       vg1 

      commands(6). Total: 15.                                                                     

                                                                                                  

Trauma Score (Adult):                                                                             

08:00 Eye Response: spontaneous(1); Verbal Response: oriented(1); Motor Response: obeys       vg1 

      commands(2); Systolic BP: > 89 mm Hg(4); Respiratory Rate: 10 to 29 per min(4); Camryn     

      Score: 15; Trauma Score: 12                                                                 

                                                                                                  

MDM:                                                                                              

08:10 Patient medically screened.                                                             [facility] 

10:18 Differential diagnosis: closed head injury, contusion, fracture, multiple trauma,       linda 

      sprain. Data reviewed: vital signs, nurses notes, lab test result(s), EKG, radiologic       

      studies, CT scan, plain films. Data interpreted: Cardiac monitor: rate is 55 beats/min,     

      rhythm is regular, Pulse oximetry: on room air is 100 %. Test interpretation: by ED         

      physician or midlevel provider: ECG, plain radiologic studies. Counseling: I had a          

      detailed discussion with the patient and/or guardian regarding: the historical points,      

      exam findings, and any diagnostic results supporting the discharge/admit diagnosis, lab     

      results, radiology results, the need for outpatient follow up, for definitive care, an      

      internist, a orthopedic surgeon.                                                            

                                                                                                  

                                                                                             

08:13 Order name: Basic Metabolic Panel; Complete Time: 10:                                 [facility] 

                                                                                             

08:13 Order name: CBC with Diff; Complete Time: :                                         [facility] 

                                                                                             

08:13 Order name: Type And Screen; Complete Time: 10:                                       [facility] 

                                                                                             

08:13 Order name: Urine Culture                                                               [facility] 

                                                                                             

08:13 Order name: LFT's; Complete Time: 10:                                                 [facility] 

                                                                                             

09:12 Order name: Magnesium                                                                   [facility] 

                                                                                             

08:13 Order name: CT Traumagram (Head C Spine CAP wo con); Complete Time: 09:59               [facility] 

                                                                                             

08:13 Order name: Shoulder Left (2 View) XRAY; Complete Time: 10:09                           [facility] 

                                                                                             

09:12 Order name: NT PRO-BNP                                                                  [facility] 

                                                                                             

09:12 Order name: PT-INR; Complete Time: 09:59                                                [facility] 

                                                                                             

09:12 Order name: Troponin HS                                                                 [facility] 

                                                                                             

10:18 Order name: Troponin High Sensitivity: at 1130; Complete Time: 12:41                    [facility] 

                                                                                             

11:27 Order name: Urine Dipstick-Ancillary; Complete Time: 11:44                              [facility]

                                                                                             

11:28 Order name: ABO/RH no charge; Complete Time: 11:44                                      [facility]

                                                                                             

08:13 Order name: Labs collected and sent; Complete Time: 09:03                               [facility] 

                                                                                             

08:13 Order name: Urine Dipstick-Ancillary (obtain specimen); Complete Time: 11:43            [facility] 

                                                                                             

08:13 Order name: Hip Left 2 View XRAY; Complete Time: 10:09                                  [facility] 

                                                                                             

09:12 Order name: XRAY Chest (1 view); Complete Time: 11:44                                   [facility] 

                                                                                             

09:12 Order name: EKG; Complete Time: 09:13                                                   [facility] 

                                                                                             

09:12 Order name: Cardiac monitoring; Complete Time: 09:16                                    [facility] 

                                                                                             

09:12 Order name: EKG - Nurse/Tech; Complete Time: 09:15                                      [facility] 

                                                                                             

09:12 Order name: IV Saline Lock; Complete Time: 09:15                                        [facility] 

                                                                                             

09:12 Order name: O2 Per Protocol; Complete Time: 09:15                                       [facility] 

                                                                                             

09:12 Order name: O2 Sat Monitoring; Complete Time: 09:16                                     [facility] 

                                                                                             

10:29 Order name: Sling; Complete Time: 13:44                                                 [facility] 

                                                                                                  

ECG:                                                                                              

10:14 Rate is 54 beats/min. QRS Axis is Normal. IN interval is normal. QRS interval is        linda 

      normal. QT interval is normal. No Q waves. T waves are Normal. Clinical impression: NSR     

      w/ Non-specific ST/T Changes and No evidence of ischemia.                                   

                                                                                                  

Administered Medications:                                                                         

09:00 Drug: NS 0.9% 500 ml Route: IV; Rate: bolus; Site: right antecubital;                   tp1 

11:39 Follow up: IV Status: Completed infusion; IV Intake: 500ml                              tp1 

11:50 Drug: Zofran (Ondansetron) 4 mg Route: IVP; Site: right antecubital;                    tp1 

13:43 Follow up: Response: No adverse reaction                                                vg1 

11:53 Drug: fentaNYL (PF) 25 mcg Route: IVP; Site: right antecubital;                         tp1 

13:43 Follow up: Response: No adverse reaction; Marked relief of symptoms                     vg1 

13:43 Not Given (Patient Refused): fentaNYL (PF) 25 mcg IVP once                              vg1 

                                                                                                  

                                                                                                  

Disposition Summary:                                                                              

22 12:41                                                                                    

Discharge Ordered                                                                                 

      Location: Home                                                                          linda 

      Problem: new                                                                            linda 

      Symptoms: have improved                                                                 linda 

      Condition: Stable                                                                       linda 

      Diagnosis                                                                                   

        - Fall on same level, unspecified                                                     linda 

        - Contusion of left shoulder                                                          linda 

        - Unspecified injury of head, initial encounter                                       linda 

        - Bradycardia, unspecified                                                            linda 

      Followup:                                                                               linda 

        - With: Private Physician                                                                  

        - When: 2 - 3 days                                                                         

        - Reason: Recheck today's complaints, Continuance of care, Re-evaluation by your           

      physician                                                                                   

      Followup:                                                                               linda 

        - With:                                                                                    

        - When: 2 - 3 days                                                                         

        - Reason: Recheck today's complaints, Re-evaluation by your physician                      

      Followup:                                                                               linda 

        - With:                                                                                    

        - When: 2 - 3 days                                                                         

        - Reason: Recheck today's complaints, Re-evaluation by your physician                      

      Discharge Instructions:                                                                     

        - Discharge Summary Sheet                                                             linda 

        - Bradycardia, Adult                                                                  linda 

        - Head Injury, Adult                                                                  linda 

        - Shoulder Pain                                                                       linda 

        - Fall Prevention in the Home, Adult                                                  linda 

        - Fall Prevention in the Home, Adult, Easy-to-Read                                    linda 

        - Head Injury, Adult, Easy-to-Read                                                    linda 

      Forms:                                                                                      

        - Medication Reconciliation Form                                                      linda 

        - Thank You Letter                                                                    linda 

        - Antibiotic Education                                                                linda 

        - Prescription Opioid Use                                                             [facility] 

      Prescriptions:                                                                              

        - Tylenol 325 mg Oral Tablet                                                               

            - take 2 tablets by ORAL route every 6 hours as needed; 1 bottle; Refills: 0,     linda 

      Product Selection Permitted                                                                 

Signatures:                                                                                       

Dispatcher [employer]                           Sergio Belle MD MD cha Garcia, Victoria RN                    RN   [id number]                                                  

Caryn Craig RN                     RN   [id number]                                                  

                                                                                                  

**************************************************************************************************

## 2022-09-09 NOTE — RAD REPORT
EXAM DESCRIPTION:  RAD - Shoulder  Left 2 View - 9/9/2022 9:50 am

 

CLINICAL HISTORY:  PAIN

 

COMPARISON:  No comparisons

 

FINDINGS:  The bones are diffusely osteopenic. AC joint and glenohumeral joint arthritic changes are 
present. No acute fracture or dislocation.

## 2022-09-09 NOTE — ER
Nurse's Notes                                                                                     

 [facility]                                                                 

Name: Kailee Whitley                                                                             

Age: 67 yrs                                                                                       

Sex: Female                                                                                       

: 1955                                                                                   

MRN: [id number]                                                                                   

Arrival Date: 2022                                                                          

Time: 08:07                                                                                       

Account#: [id number]                                                                            

Bed 6                                                                                             

Private MD:                                                                                       

Diagnosis: Fall on same level, unspecified;Contusion of left shoulder;Unspecified injury of head, 

  initial encounter;Bradycardia, unspecified                                                      

                                                                                                  

Presentation:                                                                                     

                                                                                             

08:00 Chief complaint: EMS states: toned out to [place] for a fall. stated PT rolled out of tp1 

      bed onto floor, PT found rolled partially under bed. PT denies hitting head or              

      LOC.states PT is currently taking aspirin. Reports 10/10 pain to the left shoulder that     

      does not radiated and pain to the left hip that radiates to ankle. Reports left sided       

      weakness to the left side from previous stroke. /59, .                         

08:00 Coronavirus screen: Vaccine status: Patient reports receiving the 2nd dose of the covid tp1 

      vaccine. Ebola Screen: Patient negative for fever greater than or equal to 101.5            

      degrees Fahrenheit, and additional compatible Ebola Virus Disease symptoms Patient          

      denies exposure to infectious person. Patient denies travel to an Ebola-affected area       

      in the 21 days before illness onset. Initial Sepsis Screen: Does the patient meet any 2     

      criteria? Systolic BP < 90 mmHg. No. Patient's initial sepsis screen is negative. Does      

      the patient have a suspected source of infection? No. Patient's initial sepsis screen       

      is negative. Risk Assessment: Do you want to hurt yourself or someone else? Patient         

      reports no desire to harm self or others. Onset of symptoms was 2022.         

08:00 Method Of Arrival: EMS: [facility]                                                       tp1 

08:00 Acuity: LEATHA 3                                                                           tp1 

08:00 Care prior to arrival: None. Mechanism of Injury: Fall out of bed.                      vg1 

08:00 Trauma event details: Injury occurred in the [facility].                        1 

                                                                                                  

Triage Assessment:                                                                                

08:00 General: Appears in no apparent distress. comfortable, Behavior is calm, cooperative,   tp1 

      drowsy. Pain: Complains of pain in left shoulder and left hip Pain radiates to left leg     

      Pain currently is 10 out of 10 on a pain scale. Pain began today. EENT: No signs and/or     

      symptoms were reported regarding the EENT system. Neuro: [place] Agitation-Sedation        

      Scale (RASS): 0 - Alert and Calm Level of Consciousness is awake, alert, obeys              

      commands, Oriented to person, place, time, situation, Denies headache. Cardiovascular:      

      Patient's skin is warm and dry. Respiratory: Airway is patent Respiratory effort is         

      even, unlabored. GI: Abdomen is round non-distended. : No signs and/or symptoms were      

      reported regarding the genitourinary system. Derm: Skin is pink, warm \T\ dry.              

      Musculoskeletal: pt stated left sided paralysis, left wrist is contracted, left ankle       

      is contracted.                                                                              

                                                                                                  

Historical:                                                                                       

- Allergies:                                                                                      

08:16 No Known Allergies;                                                                     tp1 

- Home Meds:                                                                                      

08:37 tylenol [Active]; aspirin 325 mg Oral tab 1 tab once daily [Active]; baclofen 10 mg     tp1 

      Oral tab 1 tab 3 times per day [Active]; Buspirone Oral [Active]; docusate sodium 250       

      mg Oral cap 1 cap once daily [Active]; duloxetine 60 mg Oral cpDR 2 caps once daily         

      [Active]; Gemtesa oral [Active]; Glimepiride Oral [Active]; latanoprost [Active];           

      Lipitor Oral [Active]; Lyrica 75 mg Oral 1 cap 3 times per day [Active]; multivitamin       

      Oral [Active]; Norco  mg Oral tab 1 tab three times a day [Active]; omeprazole 20     

      mg Oral TbEC 20 mg every 8 hours [Active]; oscal [Active]; ropinrole hcl 0.25 mg 1 tab      

      nightly [Active]; senna 8.6 mg Oral cap 2 caps two times a day [Active]; trazodone 50       

      mg Oral tab 1 tab at bedtime [Active]; Tricor Oral [Active];                                

- PMHx:                                                                                           

08:16 acid reflux; Diverticulitis; Hyperlipidemia; CVA; TIA; Diabetes mellitus; Osteoporosis; tp1 

      cranial nerve disorder; left sided weakness; restless leg syndrome; Glaucoma; cerebral      

      infarction; Anxiety; Hypertensive disorder; GERD; Depressive disorder;                      

                                                                                                  

- Immunization history:: Client reports receiving the 2nd dose of the Covid vaccine.              

- Social history:: Smoking status: Patient reports the use of cigarette tobacco                   

  products, smokes one pack cigarettes per day.                                                   

- Immunization history: Last tetanus immunization: unknown.                                       

- Family history:: not pertinent.                                                                 

                                                                                                  

                                                                                                  

Screenin:00 Abuse screen: Denies threats or abuse. Denies injuries from another. Nutritional        tp1 

      screening: No deficits noted. Tuberculosis screening: No symptoms or risk factors           

      identified. Fall Risk Fall in past 12 months (25 points). Secondary diagnosis (15           

      points) impaired mobility, CVA, IV access (20 points). Ambulatory Aid- None/Bed             

      Rest/Nurse Assist (0 pts). Gait- Impaired (20 pts.). Mental Status- Oriented to own         

      ability (0 pts). Total Lloyd Fall Scale indicates High Risk Score (45 or more points).      

      Fall prevention measures have been instituted. Side Rails Up X 2 Placed Close to            

      Nursing Station Frequent Obs/Assessments Occuring As available patient and family           

      educated on Fall Prevention Program and Strategies.                                         

                                                                                                  

Primary Survey:                                                                                   

08:00 NO uncontrolled hemorrhage observed. A: The client is awake and alert. The airway is    vg1 

      patent. Breathing/Chest: Spontaneous respiratory effort, equal unlabored respirations,      

      breath sounds clear bilaterally, regular pattern, symmetrical chest rise and fall.          

      Respiratory effort: spontaneous, Breath sounds: clear. Circulation: No external             

      hemorrhage present. Regular and strong central pulse, skin warm/dry/normal color.           

      Disability Client is alert. Exposure/Environment: All clothing and personal items were      

      removed. Forensic evidence collection is not deemed to be indicated at this time. Items     

      placed in patient belonging bag. There is no evidence of uncontrolled external              

      bleeding. pt states Left shoulder and Left hip pain.                                        

08:20 Reassessment Alertness and Airway: Awake and alert. The airway is patent. Airway Patent vg1 

      Oxygen No O2 Breathing: Spontaneous respiratory effort, equal unlabored respirations,       

      breath sounds clear bilaterally, regular pattern with symmetrical chest rise and fall.      

      Respiratory effort Spontaneous Breath sounds Clear Circulation: No external hemorrhage      

      noted. Regular and strong central pulse, skin warm/dry/normal color. Disability: Alert.     

                                                                                                  

Secondary Survey:                                                                                 

08:00 HEENT: No deficits noted. Head No injury/deformity. Gastrointestinal: No deficits       vg1 

      noted. Abdomen is soft. : No deficits noted. Musculoskeletal: pt stated left sided        

      paralysis due to hx of CVA.                                                                 

                                                                                                  

Assessment:                                                                                       

08:00 General: see triage notes .                                                             tp1 

08:28 Reassessment: escorted to CT via stretcher by RAD tech.                                 tp1 

08:49 Reassessment: transported back from CT via stretcher by RAD tech.                       tp1 

09:00 Reassessment: CO chest pain that "making it difficult to breath" and numbness to left   tp1 

      leg, Left lower extremity pedal pulses palpable, no edema noted, skin pink and warm,        

      provider notified.                                                                          

10:00 Reassessment: Patient appears in no apparent distress at this time. No changes from     tp1 

      previously documented assessment. Patient is alert, oriented x 3, equal unlabored           

      respirations, skin warm/dry/pink. continues to CO left shoulder and left hip pain.          

10:08 Reassessment: provider at bedside to assess PT.                                         tp1 

11:00 Reassessment: Patient appears in no apparent distress at this time. No changes from     tp1 

      previously documented assessment. Patient is alert, oriented x 3, equal unlabored           

      respirations, skin warm/dry/pink. continues to CO left shoulder and left hip pain.          

      resting comfortably in bed. provider notified.                                              

12:16 Reassessment: Maria Elena ORTIZ from [place] [phone] called for pt update.              tp1 

13:07 Reassessment: Maria Elena ORTIZ from [facility] was contacted and notified of pt d/c; stated    vg1 

      will get transportation ready for pt pickup.                                                

                                                                                                  

Vital Signs:                                                                                      

08:00  / 69; Pulse 57; Resp 16; Temp 98.1; Pulse Ox 91% ; Weight 80.29 kg; Height 5 ft. tp1 

      1 in. (154.94 cm); Pain 10/10;                                                              

09:00 Pulse 45;                                                                               tp1 

09:36  / 84; Pulse 55; Resp 16; Pulse Ox 96% on R/A;                                    tp1 

10:00  / 92; Pulse 55; Resp 15; Pulse Ox 96% on R/A;                                    tp1 

10:30  / 89; Pulse 52; Resp 16; Pulse Ox 96% on R/A;                                    tp1 

11:00  / 73; Pulse 53; Resp 16; Pulse Ox 96% on R/A;                                    tp1 

11:30  / 84; Pulse 59; Resp 17; Pulse Ox 95% on R/A;                                    tp1 

12:00  / 93; Pulse 61; Resp 12; Pulse Ox 93% on R/A;                                    tp1 

12:30  / 82; Pulse 55; Resp 17; Pulse Ox 95% on R/A;                                    vg1 

08:00 Body Mass Index 33.44 (80.29 kg, 154.94 cm)                                             tp1 

09:00 provider notified                                                                       tp1 

                                                                                                  

[place] Coma Score:                                                                               

08:00 Eye Response: spontaneous(4). Verbal Response: oriented(5). Motor Response: obeys       vg1 

      commands(6). Total: 15.                                                                     

                                                                                                  

Trauma Score (Adult):                                                                             

08:00 Eye Response: spontaneous(1); Verbal Response: oriented(1); Motor Response: obeys       vg1 

      commands(2); Systolic BP: > 89 mm Hg(4); Respiratory Rate: 10 to 29 per min(4); Camryn     

      Score: 15; Trauma Score: 12                                                                 

                                                                                                  

ED Course:                                                                                        

08:00 Arm band placed on.                                                                     tp1 

08:00 Patient has correct armband on for positive identification. Bed in low position. Call   tp1 

      light in reach. Side rails up X2. Pulse ox on. NIBP on. Warm blanket given.                 

08:00 Patient maintains SpO2 saturation greater than 95% on room air.                         vg1 

08:00 Thermoregulation: warm blanket given to patient.                                        vg1 

08:07 Patient arrived in ED.                                                                  tp1 

08:10 Sergio Tellez MD is Attending Physician.                                             linda 

08:15 Triage completed.                                                                       tp1 

08:31 CT Traumagram (Head C Spine CAP wo con) In Process Unspecified.                         EDMS

08:32 Caryn Craig, TERRENCE is Primary Nurse.                                                   tp1 

08:50 Inserted saline lock: 20 gauge in right antecubital area, using aseptic technique.      tp1 

      Blood collected.                                                                            

09:12 EKG done, by ED staff.                                                                  tp1 

09:13 Cardiac monitor on.                                                                     tp1 

09:52 Shoulder Left (2 View) XRAY In Process Unspecified.                                     EDMS

09:52 Hip Left 2 View XRAY In Process Unspecified.                                            EDMS

10:44 XRAY Chest (1 view) In Process Unspecified.                                             EDMS

11:21 Straight cath inserted, using sterile technique, 14 Fr. Specimen obtained. 400 mL       tp1 

      yellow colored urine.                                                                       

12:41 Beto Simpson MD is Referral Physician.                                              linda 

12:41 Victor Hugo Noel MD is Referral Physician.                                               linda 

13:46 No provider procedures requiring assistance completed. IV discontinued, intact,         vg1 

      bleeding controlled, No redness/swelling at site. Pressure dressing applied.                

                                                                                                  

Administered Medications:                                                                         

09:00 Drug: NS 0.9% 500 ml Route: IV; Rate: bolus; Site: right antecubital;                   tp1 

11:39 Follow up: IV Status: Completed infusion; IV Intake: 500ml                              tp1 

11:50 Drug: Zofran (Ondansetron) 4 mg Route: IVP; Site: right antecubital;                    tp1 

13:43 Follow up: Response: No adverse reaction                                                vg1 

11:53 Drug: fentaNYL (PF) 25 mcg Route: IVP; Site: right antecubital;                         tp1 

13:43 Follow up: Response: No adverse reaction; Marked relief of symptoms                     vg1 

13:43 Not Given (Patient Refused): fentaNYL (PF) 25 mcg IVP once                              vg1 

                                                                                                  

                                                                                                  

Medication:                                                                                       

08:00 VIS not applicable for this client.                                                     tp1 

                                                                                                  

Intake:                                                                                           

11:39 IV: 500ml; Total: 500ml.                                                                tp1 

13:47 IV: 500ml (IV Fluid); Total: 1000ml.                                                    vg1 

                                                                                                  

Output:                                                                                           

13:47 Urine: 400ml (Straight Cath); Total: 400ml.                                             vg1 

                                                                                                  

Outcome:                                                                                          

12:41 Discharge ordered by MD. mckeon 

13:46 Discharged to nursing home. Report called to  Maria Elena ORTIZ                                vg1 

13:46 Condition: good                                                                             

13:46 Discharge instructions given to patient, nursing home, Instructed on discharge              

      instructions, follow up and referral plans. medication usage, Demonstrated                  

      understanding of instructions, follow-up care, medications, Prescriptions given X 1.        

13:47 Patient's length of stay was not longer than 2 hours.                                   vg1 

13:48 Patient left the ED.                                                                    vg1 

                                                                                                  

Signatures:                                                                                       

Dispatcher [employer]                           EDMS                                                 

Sergio Tellez MD MD cha Leal, Jahala, RN                        RN   jl7                                                  

Pam Perez RN                    RN   vg1                                                  

Caryn Craig RN                     RN   tp1                                                  

                                                                                                  

Corrections: (The following items were deleted from the chart)                                    

09:32 08:00 Musculoskeletal: pt stated left sided paralysis, left wrist is contracted tp1     jl7 

09:33 08:00 Musculoskeletal: pt stated left sided paralysis, left wrist is contracted, left   jl7 

      ankle is contracted jl7                                                                     

09:34 08:00 Musculoskeletal: pt stated left sided paralysis, left wrist is contracted jl7     tp1 

10:09 08:00 General: Appears in no apparent distress. comfortable, Behavior is calm,          tp1 

      cooperative, tp1                                                                            

11:24 10:00 Reassessment: Patient appears in no apparent distress at this time. No changes    tp1 

      from previously documented assessment. Patient is alert, oriented x 3, equal unlabored      

      respirations, skin warm/dry/pink. tp1                                                       

11:54 11:00 Reassessment: Patient appears in no apparent distress at this time. No changes    tp1 

      from previously documented assessment. Patient is alert, oriented x 3, equal unlabored      

      respirations, skin warm/dry/pink. continues to CO left shoulder and left hip pain.          

      provider notified tp1                                                                       

12:09 09:36  / 84; Pulse 55bpm; Resp 16bpm; Pulse Ox 100% RA; tp1                       tp1 

12:09 11:00  / 73; Pulse 53bpm; Resp 16bpm; Pulse Ox 100% RA; tp1                       tp1 

12:48 12:16 Reassessment: Maria Elena ORTIZ from [facility] called for pt update tp1                  tp1 

                                                                                                  

**************************************************************************************************

## 2022-09-09 NOTE — RAD REPORT
EXAM DESCRIPTION:  RAD - Hip Left 2 View - 9/9/2022 9:50 am

 

CLINICAL HISTORY:  PAIN

 

COMPARISON:  No comparisons

 

FINDINGS:  Moderate degenerative changes present left hip. No fracture, dislocation or AVN grossly ap
preciated. If the patient has significant pain, MRI followup would be recommended to evaluate for occ
ult fracture given the degree osteopenia.

## 2022-09-12 NOTE — EKG
Test Date:    2022-09-09               Test Time:    09:10:40

Technician:   KENNEY                                     

                                                     

MEASUREMENT RESULTS:                                       

Intervals:                                           

Rate:         54                                     

ID:           [id number]                                    

QRSD:         72                                     

QT:           464                                    

QTc:          440                                    

Axis:                                                

P:            69                                     

ID:           162                                    

QRS:          69                                     

T:            83                                     

                                                     

INTERPRETIVE STATEMENTS:                                       

                                                     

Sinus bradycardia

Otherwise normal ECG

Compared to ECG 12/09/2020 18:48:38

Sinus rhythm no longer present



Electronically Signed On 09-12-22 05:39:50 CDT by Victor Hugo Noel

## 2023-09-14 ENCOUNTER — HOSPITAL ENCOUNTER (INPATIENT)
Dept: HOSPITAL 97 - ER | Age: 68
LOS: 3 days | Discharge: NURSING, REHAB OR LONG-TERM CARE | DRG: 481 | End: 2023-09-17
Attending: INTERNAL MEDICINE | Admitting: INTERNAL MEDICINE
Payer: COMMERCIAL

## 2023-09-14 VITALS — BODY MASS INDEX: 29.5 KG/M2

## 2023-09-14 DIAGNOSIS — G25.81: ICD-10-CM
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
## 2023-09-14 DIAGNOSIS — M81.0: ICD-10-CM

## 2023-09-14 DIAGNOSIS — S72.142A: Primary | ICD-10-CM

## 2023-09-14 DIAGNOSIS — Z79.899: ICD-10-CM

## 2023-09-14 DIAGNOSIS — E78.00: ICD-10-CM

## 2023-09-14 DIAGNOSIS — D72.829: ICD-10-CM

## 2023-09-14 DIAGNOSIS — Z79.84: ICD-10-CM
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
## 2023-09-14 DIAGNOSIS — K21.9: ICD-10-CM

## 2023-09-14 DIAGNOSIS — E11.9: ICD-10-CM

## 2023-09-14 DIAGNOSIS — N17.9: ICD-10-CM
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
## 2023-09-14 DIAGNOSIS — Z79.82: ICD-10-CM

## 2023-09-14 DIAGNOSIS — Z90.49: ICD-10-CM

## 2023-09-14 LAB
ALBUMIN SERPL BCP-MCNC: 3.6 G/DL (ref 3.4–5)
ALP SERPL-CCNC: 55 U/L (ref 45–117)
ALT SERPL W P-5'-P-CCNC: 35 U/L (ref 13–56)
AST SERPL W P-5'-P-CCNC: 26 U/L (ref 15–37)
BLD SMEAR INTERP: (no result)
BUN BLD-MCNC: 17 MG/DL (ref 7–18)
GLUCOSE SERPLBLD-MCNC: 204 MG/DL (ref 74–106)
HCT VFR BLD CALC: 38.6 % (ref 36–45)
INR BLD: 1
LYMPHOCYTES # SPEC AUTO: 1.6 K/UL (ref 0.7–4.9)
MCV RBC: 90.2 FL (ref 80–100)
MORPHOLOGY BLD-IMP: (no result)
PMV BLD: 7.9 FL (ref 7.6–11.3)
POTASSIUM SERPL-SCNC: 4.2 MEQ/L (ref 3.5–5.1)
RBC # BLD: 4.27 M/UL (ref 3.86–4.86)
WBC # BLD AUTO: 16 THOU/UL (ref 4.3–10.9)

## 2023-09-14 PROCEDURE — 96374 THER/PROPH/DIAG INJ IV PUSH: CPT

## 2023-09-14 PROCEDURE — 97530 THERAPEUTIC ACTIVITIES: CPT

## 2023-09-14 PROCEDURE — 80053 COMPREHEN METABOLIC PANEL: CPT

## 2023-09-14 PROCEDURE — 72192 CT PELVIS W/O DYE: CPT

## 2023-09-14 PROCEDURE — 80048 BASIC METABOLIC PNL TOTAL CA: CPT

## 2023-09-14 PROCEDURE — 82947 ASSAY GLUCOSE BLOOD QUANT: CPT

## 2023-09-14 PROCEDURE — 85025 COMPLETE CBC W/AUTO DIFF WBC: CPT

## 2023-09-14 PROCEDURE — 72170 X-RAY EXAM OF PELVIS: CPT

## 2023-09-14 PROCEDURE — 71045 X-RAY EXAM CHEST 1 VIEW: CPT

## 2023-09-14 PROCEDURE — 85610 PROTHROMBIN TIME: CPT

## 2023-09-14 PROCEDURE — 36415 COLL VENOUS BLD VENIPUNCTURE: CPT

## 2023-09-14 PROCEDURE — 72125 CT NECK SPINE W/O DYE: CPT

## 2023-09-14 PROCEDURE — 70450 CT HEAD/BRAIN W/O DYE: CPT

## 2023-09-14 PROCEDURE — 83735 ASSAY OF MAGNESIUM: CPT

## 2023-09-14 PROCEDURE — 76000 FLUOROSCOPY <1 HR PHYS/QHP: CPT

## 2023-09-14 PROCEDURE — 84100 ASSAY OF PHOSPHORUS: CPT

## 2023-09-14 PROCEDURE — 96375 TX/PRO/DX INJ NEW DRUG ADDON: CPT

## 2023-09-14 PROCEDURE — 85730 THROMBOPLASTIN TIME PARTIAL: CPT

## 2023-09-14 PROCEDURE — 97161 PT EVAL LOW COMPLEX 20 MIN: CPT

## 2023-09-14 PROCEDURE — 99285 EMERGENCY DEPT VISIT HI MDM: CPT

## 2023-09-14 PROCEDURE — 93005 ELECTROCARDIOGRAM TRACING: CPT

## 2023-09-14 RX ADMIN — TRAZODONE HYDROCHLORIDE SCH: 150 TABLET ORAL at 21:00

## 2023-09-14 RX ADMIN — ATORVASTATIN CALCIUM SCH: 20 TABLET, FILM COATED ORAL at 21:00

## 2023-09-14 RX ADMIN — LATANOPROST SCH: 50 SOLUTION OPHTHALMIC at 21:00

## 2023-09-14 RX ADMIN — HYDROCODONE BITARTRATE AND ACETAMINOPHEN SCH: 5; 325 TABLET ORAL at 21:00

## 2023-09-14 RX ADMIN — MORPHINE SULFATE PRN MG: 2 INJECTION, SOLUTION INTRAMUSCULAR; INTRAVENOUS at 22:38

## 2023-09-14 RX ADMIN — DULOXETINE HYDROCHLORIDE SCH: 30 CAPSULE, DELAYED RELEASE ORAL at 21:00

## 2023-09-14 RX ADMIN — PREGABALIN SCH: 50 CAPSULE ORAL at 21:00

## 2023-09-14 RX ADMIN — BUSPIRONE HYDROCHLORIDE SCH: 5 TABLET ORAL at 21:00

## 2023-09-14 RX ADMIN — MORPHINE SULFATE PRN MG: 2 INJECTION, SOLUTION INTRAMUSCULAR; INTRAVENOUS at 18:25

## 2023-09-14 RX ADMIN — BACLOFEN SCH: 10 TABLET ORAL at 21:00

## 2023-09-14 RX ADMIN — SODIUM CHLORIDE SCH: 0.9 INJECTION, SOLUTION INTRAVENOUS at 19:00

## 2023-09-14 RX ADMIN — PANTOPRAZOLE SODIUM SCH MG: 40 TABLET, DELAYED RELEASE ORAL at 21:00

## 2023-09-14 RX ADMIN — HUMAN INSULIN SCH: 100 INJECTION, SOLUTION SUBCUTANEOUS at 20:47

## 2023-09-14 NOTE — P.CNS
Date of Consult: 23


Reason for Consult: Left hip fracture


Chief Complaint: Left hip fracture


History of Present Illness: 





Patient was performing a transfer from bed to chair at [facility] at 9 AM

today when she slipped and fell she localized pain to her left hip the pain is 

10/10 today she had a stroke 15 years ago and has posturing of her left arm she 

lays on her right side with her right hip flexed she is nonambulatory but has 

been able to do transfers she just started working on physical therapy to try to

get stronger she is not taking any anticoagulants but she does take an aspirin a

day and her last 1 was this morning she did eat dinner tonight


Allergies





NKDA Allergy (Uncoded 11/15/15 14:08)


   Unknown


No Known Allergies Allergy (Uncoded 16 21:12)


   Unknown





Home Medications: 








Atorvastatin Calcium [Lipitor*] 20 mg PO BEDTIME 14 


Ca/D3/Mag Ox/Zinc//Anshul/Bor [Calcium 600-D3 Plus Caplet] 1 tab PO DAILY 

14 


Docusate [Colace Cap*] 200 mg PO DAILY 14 


Duloxetine HCl [Cymbalta] 120 mg PO BEDTIME 14 


Hydrocodone/Acetaminophen [Vicodin 5-325 mg Tablet] 1 each PO TID 14 


Pantoprazole Sodium [Protonix] 40 mg PO BID 14 


Pregabalin [Lyrica] 100 mg PO TID 14 


Ropinirole HCl 1 mg PO BEDTIME PRN PRN 14 


Trazodone HCl [Desyrel] 300 mg PO BEDTIME 14 


Aspirin Chewable [Aspirin Chewable*] 81 mg PO DAILY 23 


Baclofen 10 mg PO Q8H 23 


Bupropion *Xl* [Wellbutrin XL] 300 mg PO DAILY 23 


Buspirone HCl [Buspar] 30 mg PO BID 23 


Fenofibrate [Tricor] 145 mg PO BEDTIME 23 


Glimepiride [Amaryl] 4 mg PO DAILY 23 


Latanoprost Ophth [Xalatan 0.005%*] 1 gtt RIGHT EYE BEDTIME 23 


Metformin HCl [Glucophage] 500 mg PO BIDWM 23 


Mirabegron [Myrbetriq] 50 mg PO DAILY 23 








- Past Medical/Surgical History


Diabetic: No


-: CVA let side paraylized


-: high cholesterol


-: Acid Reflux


-: Hyperlipidemia


-: Anxiety


-: Depression


-: [place]


-: c section


-: guillermo


-: hyst


-: Rt carotid artery surgery





- Family History


  ** Mother


Medical History: Heart disease, Hypertension, GI disease, Diabetes, Cancer, 

Kidney disease


Notes: colon ca





  ** Father


Medical History: Heart disease, Hypertension





- Social History


Smoking Status: Current every day smoker


Alcohol use: Yes


CD- Drugs: No


Caffeine use: Yes





Review of Systems


10-point ROS is otherwise unremarkable





Physical Examination














Temp Pulse Resp BP Pulse Ox


 


 98.3 F   89   18   135/49 L   


 


 23 10:27  23 14:07  23 14:07  23 14:07   








General: Oriented x3


HEENT: Normocephalic


Respiratory: Normal air movement


Musculoskeletal: Other (Pain localized to her left hip intertrochanteric region 

she lays postured on her right side with her left hip up and her left leg turned

 in she reports this is her standard resting posture since her stroke)


Laboratory Data (last 24 hrs)











  23





  12:15 12:15 12:15


 


WBC    16.00 H


 


Hgb    12.9


 


Hct    38.6


 


Plt Count    391


 


PT  11.0  


 


INR  1.00  


 


APTT  30.8  


 


Sodium   139 


 


Potassium   4.2 


 


BUN   17 


 


Creatinine   1.06 H 


 


Glucose   204 H 


 


Total Bilirubin   0.2 


 


AST   26 


 


ALT   35 


 


Alkaline Phosphatase   55 








Imagings Data: 


Name: ALEK LAM Acct Number: [id number] 


: 1955 Age: 68 Sex: F


Unit Number: [id number]


Ord Phys: Mikael Rondon


[facility] Dr: NONE


Status: REG ER ER Accession #: [id number]


Exam Date: 23


Reason for Exam: PAIN


Report Status: Signed


EXAM DESCRIPTION: RAD - Hip Left 2 View - 2023 10:59 am


CLINICAL HISTORY: PAIN


COMPARISON: Hip Left 2 View dated 2022


TECHNIQUE: Left hip, AP and frogleg views of the left hip.


FINDINGS: Comminuted left hip intertrochanteric fracture. Surrounding soft 

tissue swelling. No 


dislocation.


IMPRESSION: Comminuted left hip intertrochanteric fracture.


Dictated By: Dereje Ramírez MD 23 [id number]


Signed By: Dereje Ramírez MD 23 [id number]





- Problems


(1) Intertrochanteric fracture of left hip


Current Visit: Yes   Status: Acute   


Qualifiers: 


   Encounter type: initial encounter   Fracture type: closed   Fracture 

alignment: displaced   Qualified Code(s): S72.142A - Displaced intertrochanteric

 fracture of left femur, initial encounter for closed fracture   


Conclusions/Impression: 


Displaced intertrochanteric fracture left hip patient was consented for a left 

hip intramedullary rodding, we discussed the risks and benefits of the procedure

 to include fat embolism lengthening shortening and standard surgical risks she 

will need cardiac clearance and evaluation for fitness for surgery if she is 

cleared for surgery by noon tomorrow we can do the procedure tomorrow at noon.

## 2023-09-14 NOTE — XMS REPORT
Continuity of Care Document

                          Created on:2023



Patient:ALEK LAM

Sex:Female

:1955

External Reference #:[id number]





Demographics







                          Address                   [address] [address]

 

                          Home Phone                [phone]

 

                          Email Address             [place]

 

                          Preferred Language        Unknown

 

                          Marital Status            Unknown

 

                          Quaker Affiliation     Unknown

 

                          Race                      Unknown

 

                          Additional Race(s)        White

 

                          Ethnic Group              Unknown









Author







                          Organization              [facility]

t

 

                          Address                   [address]



                                                    [place] [address]

 

                          Phone                     [phone]









Support







                Name            Relationship    Address         Phone

 

                ADAM AGGARWAL  Unavailable     [phone]        Unavailable



                                                [place] [address] 









Care Team Providers







                    Name                Role                Phone

 

                    NONSTAFF            Primary Care Physician Unavailable

 

                    MARINO BETANCOURT     Attending Clinician Unavailable

 

                    MARINO BETANCOURT     Admitting Clinician Unavailable









Payers







           Payer Name Policy Type Policy Number Effective Date Expiration Date S

ource

 

           United                [id number]  2019            [facility]                       00:00:00              Patient



           [facility]

 

           Medicare A & B            [id number] 2010            [facility]



                                            00:00:00              Patient



                                                                  Medical



                                                                  Center







Problems







       Condition Condition Condition Status Onset  Resolution Last   Treating Co

mments 

Source



       Name   Details Category        Date   Date   Treatment Clinician        



                                                 Date                 

 

       Problem        Condition Active                                    [facility]







Allergies, Adverse Reactions, Alerts







       Allergy Allergy Status Severity Reaction(s) Onset  Inactive Treating Comm

ents 

Source



       Name   Type                        Date   Date   Clinician        

 

       No Known DA     Active                                           [facility]                                                              Patient



                                                                      [facility]







Social History







           Social Habit Start Date Stop Date  Quantity   Comments   Source

 

           Sex Assigned At 1955 Female                [facility]



           Birth      00:00:00   00:00:00                         Patient Medica

l



                                                                  Center







Medications







       Ordered Filled Start  Stop   Current Ordering Indication Dosage Frequency

 Signature

                    Comments            Components          Source



     Medication Medication Date Date Medication? Clinician                (SIG) 

          



     Name Name                                                   

 

     Calcium Calcium           Yes            1         Twice A           [facility]



     Carbonate/V Carbonate/V                                    Day for         

  [facility]



     itamin D3 itamin D3                                    Osteoporos          

 Patient



     (Calcium (Calcium                                    is             Medical



     500 + Vit D 500 + Vit D                                                   C

enter



     200 Tablet) 200 Tablet)                                                   



     1 Each 1 Each                                                   



     TABLET TABLET                                                   

 

     Docusate Docusate           Yes            2         Daily as           [facility]



     Sodium Sodium                                    needed for           [facility]



                                                  Constipati           Patient



                                                  on             Medical



                                                                 Center

 

     Duloxetine Duloxetine           Yes            60        Daily for         

  [facility]



     Hcl  Hcl                                     Major           [facility]



     (Cymbalta) (Cymbalta)                                    Depressive        

   Patient



     30 Mg 30 Mg                                    Disorder           Medical



     CAPSULE.DR CAPSULE.DR Lucero ramirez

 

     Fenofibrate Fenofibrate           Yes            145       Bedtime         

  [facility]



     (Tricor) (Tricor)                                    for            [facility]



     145 Mg  Mg TAB                                    Hyperlipid        

   Patient



                                                  [facility]

 

     Furosemide Furosemide           Yes            20        Daily for         

  [facility]



     (Lasix) 20 (Lasix) 20                                    Chronic           

[facility]



     Mg TABLET Mg TABLET                                    Ischemic           P

atient



                                                  [facility]



                                                  Disease           Center

 

     Levofloxaci Levofloxaci           Yes            500       Daily for       

    [facility]



     n    n                                       Diverticul           [facility]



                                                  itis           Patient



                                                                 Medical



                                                                 Center

 

     Loratadine Loratadine           Yes            10        Daily for         

  [facility]



                                                  Seasonal           [facility]



                                                  Allergies           Patient



                                                                 Medical



                                                                 Center

 

     Metronidazo Metronidazo           Yes            1         Every 8         

  [facility]



     le (Flagyl) le (Flagyl)                                    Hours for       

    [facility]



     500 Mg 500 Mg                                    Diverticul           Patie

nt



     TABLET TABLET                                    [facility]

 

     Omeprazole Omeprazole           Yes            20        Daily for         

  [facility]



                                                  Gerd           [facility]



                                                                 [facility]



                                                                 Medical



                                                                 Center

 

     Oxybutynin Oxybutynin           Yes            5         Daily for         

  [facility]



     Chloride Chloride                                    Stress           [facility]



     (Ditropan (Ditropan                                    Incontinen          

 Patient



     Xl) 5 Mg Xl) 5 Mg                                    ce             Medical



     TAB.ER.24 TAB.ER.24                                                   Cente

r

 

     Potassium Potassium           Yes            10        Daily for           

[facility]



     Chloride Chloride                                    Chronic           Luke

s



                                                  Ischemic           Patient



                                                  Heart           Medical



                                                  Disease           Center

 

     Promethazin Promethazin           Yes            25        Every 8         

  [facility]



     e Hcl e Hcl                                    Hours as           [facility]



     (Phenergan) (Phenergan)                                    needed for      

     Patient



     25 Mg/1 Ml 25 Mg/1 Ml                                    Nausea And        

   Medical



     AMPUL AMPUL                                    Vomiting           Center

 

     Propranolol Propranolol           Yes            40        Twice A         

  [facility]



     Hcl  Hcl                                     Day for           [facility]



                                                  Primary           Patient



                                                  Hypertensi           Medical



                                                  on             Center

 

     Ropinirole Ropinirole           Yes                      Bedtime           

[facility]



     Hcl  Hcl                                     for            [facility]



                                                  Restless           Patient



                                                  Leg            Medical



                                                  Syndrome           Center

 

     [facility]           Yes            8.6       Every 12          

 [facility]



     (Senna Lax) (Senna Lax)                                    Hours as        

   [facility]



     8.6 Mg 8.6 Mg                                    needed for           Patie

nt



     TABLET TABLET                                    Constipati           Medic

al



                                                  on             Center

 

     Trazodone Trazodone           Yes            1         Bedtime           [facility]

I St



     Hcl  Hcl                                     for            [facility]



                                                  Insomnia           Patient



                                                                 Medical



                                                                 Center

 

     Baclofen Baclofen           Yes            10        Three           [facility]



                                                  Times A           [facility]



                                                  Day for           Patient



                                                  Muscle           Medical



                                                  Spasm           Center







Vital Signs







             Vital Name   Observation Time Observation Value Comments     Source

 

             Body Temperature 2020-12-15 08:49:00 98.0 [degF]               [facility]



                                                                 [facility] Medical



                                                                 Center

 

             Heart Rate   2020-12-15 08:49:00 89 /min                   [facility]

 

             Respiratory rate 2020-12-15 08:49:00 18 /min                   [facility]

 

             BP Systolic  2020-12-15 08:49:00 151 mm[Hg]                [facility]

 

             BP Diastolic 2020-12-15 08:49:00 81 mm[Hg]                 [facility]

 

             Oxygen saturation by 2020-12-15 08:49:00 98 /min                   

[facility]



             Pulse oximetry                                        [facility]

 

             BMI (Body Mass Index) 2020-12-10 10:44:00 36.3 kg/m2               

 [facility]

 

             Weight       2020-12-10 04:39:00 225 [lb_av]               [facility]







Procedures







                Procedure       Date / Time Performed Performing Clinician [facility]

e

 

                X-ray of chest, two 2020 00:00:00                 [facility]







Encounters







        Start   End     Encounter Admission Attending Care    Care    Encounter 

Source



        Date/Time Date/Time Type    Type    Clinicians Facility Department ID   

   

 

        2020-12-10         Inpatient                 [facility]  [id number] 

[facility]



        00:06:00                                                 -2020 [facility]

 

        2020-12-10 2020-12-15 Discharged 1       BETANCOURT, [facility] 

[id number] [facility]



        04:17:00 12:05:00 Inpatient         [facility]         Patients [address] [place] [address]







Results







           Test Description Test Time  Test Comments Results    Result Comments 

Source









                          Capillary blood glucose measurement by glucometer (mas

s/volume) 2020-12-15 

10:32:00                                









                      Test Item  Value      Reference Range Interpretation Comme

nts









             Bedside Glucose (test code = 21683-2) 107 mg/dL              

          



[facility] measurement by 
glucometer (mass/volume)2020-12-15 10:32:00





             Test Item    Value        Reference Range Interpretation Comments

 

             Bedside Glucose (test code = 107 mg/dL                       

 



             15070-5)                                            



[facility] Hospital[facility] procedure less than one hour 
[id number] 16:15:00





             Test Item    Value        Reference Range Interpretation Comments

 

             Coronavirus (PCR) (test code = NOT DETECTED NOTDETECTED            

   



             Coronavirus (PCR))                                        



SARS-COV2/RT-PCR CEPHEIDResults are for the detection of SARS-COV-2 RNA. The 
SARS-COV-2 RNA is generally detectable in nasopharyngeal swab specimens during 
the acute phase of infection. Positive results are indicitive of active 
infection with SARS-COV-2; clinical correlation with patient history and other 
diagnostic information is necessary to determine patient infection status. 
Positive results do not rule out bacterial infection or co-infection with other 
viruses. The agent detected may not be thedefinite cause of the disease.The 
limit of detection for this assay is 250 copies/mLThe SARS-CoV-2 test is a 
rapid, real-time RT-PCR test intended for the qualitative detection of nucleic 
acid from SARS-CoV-2 in nasopharyngeal swab specimen collected from individuals 
suspected of COVID-19 by their healthcare provider. This test has not been Food 
and Drug Administration (FDA) cleared or approved and has been authorized by FDA
 under an Emergency Use Authorization (EUA). This EUA will be effective until 
the declaration that circumstances exist justifying the authorization of the 
emergency use of in vitro diagnostic test for detection and or diagnosis of 
COVID-19 is terminated under section 564(b) of the Act, or the the EUA is 
revoked under 564(g) of the ACT.[facility] 
procedure less than one hour [id number] 16:15:00





             Test Item    Value        Reference Range Interpretation Comments

 

             Coronavirus (PCR) (test code = NOT DETECTED NOTDETECTED            

   



             Coronavirus (PCR))                                        



SARS-COV2/RT-PCR CEPHEIDResults are for the detection of SARS-COV-2 RNA. The 
SARS-COV-2 RNA is generally detectable in nasopharyngeal swab specimens during 
the acute phase of infection. Positive results are indicitive of active 
infection with SARS-COV-2; clinical correlation with patient history and other 
diagnostic information is necessary to determine patient infection status. 
Positive results do not rule out bacterial infection or co-infection with other 
viruses. The agent detected may not be thedefinite cause of the disease.The 
limit of detection for this assay is 250 copies/mLThe SARS-CoV-2 test is a 
rapid, real-time RT-PCR test intended for the qualitative detection of nucleic 
acid from SARS-CoV-2 in nasopharyngeal swab specimen collected from individuals 
suspected of COVID-19 by their healthcare provider. This test has not been Food 
and Drug Administration (FDA) cleared or approved and has been authorized by FDA
 under an Emergency Use Authorization (EUA). This EUA will be effective until 
the declaration that circumstances exist justifying the authorization of the 
emergency use of in vitro diagnostic test for detection and or diagnosis of 
COVID-19 is terminated under section 564(b) of the Act, or the the EUA is 
revoked under 564(g) of the ACT.[facility]CH[facility]IEWS
2020 11:47:00
************************************************************[facility] : 1955  Sex: 
F************************************************************ [address] ALEK LAM MR #: [id number] : 1955  Age/Sex: 65/F 
Acct #: [id number] Req #: [id number] [facility] Physician: MARINO BETANCOURT MD Ordered 
by: MARINO BETANCOURT MD Report #: [id number] Location: MED/SURG3 Room/Bed: [facility] 
___________________________________________________________________
________________________________ Procedure: [id number] DX/CHEST 2 VIEWS Exam 
Date: 12/14/20 Exam Time: 1115  REPORT STATUS: Signed EXAMINATION: CHEST 2 VIEWS
 INDICATION: Pneumonia COMPARISON: Chest radiograph 12/10/2020 FINDINGS: 
LINES/TUBES:None LUNGS:The lungs are well-inflated. No focal consolidation or 
pulmonary edema. PLEURA:No pleural effusion or pneumothorax. MEDIASTINUM:The 
cardiomediastinal silhouette appears normal in size and shape. BONES/SOFT 
TISSUES:No acute osseous injury. ABDOMEN:No free air under the diaphragm. 
IMPRESSION: No focal pneumonia or pulmonary edema. Signed by: Rodrigo Draper MD on 
2020 11:48 AM Dictated By: RODRIGO DRAPER MD Electronically Signed By: RODRIGO DRAPER MD on 20 1148 Transcribed By: ARON on 20 1148 COPY TO: 
MARINO BETANCOURT leukocytes automated count (number/volume)2020 
05:24:00





             Test Item    Value        Reference Range Interpretation Comments

 

             White Blood Count (test code = 9.55 10*3/uL 4.8-10.8               

   



             6690-2)                                             



[facility] HospitalBlood erythrocytes automated count 
(number/volume)2020 05:24:00





             Test Item    Value        Reference Range Interpretation Comments

 

             Red Blood Count (test code = 3.89 10*6/mL 3.6-5.1                  

 



             789-8)                                              



[facility] hemoglobin measurement (moles/volume)
2020 05:24:00





             Test Item    Value        Reference Range Interpretation Comments

 

             Hemoglobin (test code = 36178-3) 12.0 g/dL    12.0-16.0            

     



[facility] HospitalAutomated blood hematocrit (volume fraction)
2020 05:24:00





             Test Item    Value        Reference Range Interpretation Comments

 

             Hematocrit (test code = 4544-3) 35.8 %       34.2-44.1             

    



[facility] HospitalAutomated erythrocyte mean corpuscular volume
2020 05:24:00





             Test Item    Value        Reference Range Interpretation Comments

 

             Mean Corpuscular Volume (test code = 92.0         81-99            

         



             787-2)                                              



[facility] HospitalAutomated erythrocyte mean corpuscular 
hemoglobin (mass per erythrocyte)2020 05:24:00





             Test Item    Value        Reference Range Interpretation Comments

 

             Mean Corpuscular Hemoglobin (test 30.8 pg      28-32               

      



             code = 785-6)                                        



[facility] HospitalAutomated erythrocyte mean corpuscular 
hemoglobin concentration measurement (mass/volume)2020 05:24:00





             Test Item    Value        Reference Range Interpretation Comments

 

             Mean Corpuscular Hemoglobin Concent 33.5 g/dL    31-35             

        



             (test code = 786-4)                                        



[facility] HospitalRDW [id number] 05:24:00





             Test Item    Value        Reference Range Interpretation Comments

 

             Red Cell Distribution Width (test code 14.3 %       11.7-14.4      

           



             = 35872-4)                                          



[facility] HospitalAutom[facility] blood platelet count (count/volume)
2020 05:24:00





             Test Item    Value        Reference Range Interpretation Comments

 

             Platelet Count (test code = 298 10*3/uL  140-360                   



             777-3)                                              



[facility] HospitalAutomated blood segmented neutrophil count as
 percentage of total [id number] 05:24:00





             Test Item    Value        Reference Range Interpretation Comments

 

             Neutrophils (%) (Auto) (test code = 66.9 %       38.7-80.0         

        



             22151-8)                                            



[facility] HospitalAutomated blood lymphocyte count as 
percentage ot total rgevmwevxy2104-12-50 05:24:00





             Test Item    Value        Reference Range Interpretation Comments

 

             Lymphocytes (%) (Auto) (test code = 22.2 %       18.0-39.1         

        



             736-9)                                              



[facility] HospitalAutomated blood monocyte count as percentage 
of total alukmizmlj3674-89-84 05:24:00





             Test Item    Value        Reference Range Interpretation Comments

 

             Monocytes (%) (Auto) (test code = 8.6 %        4.4-11.3            

      



             5905-5)                                             



[facility] HospitalAutomated blood eosinophil count as 
percentage of total esapzqtldj5967-03-68 05:24:00





             Test Item    Value        Reference Range Interpretation Comments

 

             Eosinophils (%) (Auto) (test code = 1.2 %        0.0-6.0           

        



             713-8)                                              



[facility] HospitalAutomated blood basophil count as percentage 
of total cekuyzznfs6836-09-38 05:24:00





             Test Item    Value        Reference Range Interpretation Comments

 

             Basophils (%) (Auto) (test code = 0.3 %        0.0-1.0             

      



             706-2)                                              



[facility] HospitalFluoroscopic procedure less than one hour 
[id number] 05:24:00





             Test Item    Value        Reference Range Interpretation Comments

 

             IM GRANULOCYTES % (test code = IM 0.8 %        0.0-1.0             

      



             GRANULOCYTES %)                                        



[facility] HospitalAutomated blood neutrophil [id number] 
05:24:00





             Test Item    Value        Reference Range Interpretation Comments

 

             Neutrophils # (Auto) (test code = 6.4          2.1-6.9             

      



             751-8)                                              



[facility] HospitalBlood lymphocytes count (number/volume)
2020 05:24:00





             Test Item    Value        Reference Range Interpretation Comments

 

             Lymphocytes # (Auto) (test code = 2.1          1.0-3.2             

      



             54186-2)                                            



[facility] monocytes automated count 
(number/volume)2020 05:24:00





             Test Item    Value        Reference Range Interpretation Comments

 

             Monocytes # (Auto) (test code = 742-7) 0.8          0.2-0.8        

           



[facility] eosinophil [id number] 
05:24:00





             Test Item    Value        Reference Range Interpretation Comments

 

             Eosinophils # (Auto) (test code = 0.1          0.0-0.4             

      



             711-2)                                              



[facility] basophil count (count/volume)
2020 05:24:00





             Test Item    Value        Reference Range Interpretation Comments

 

             Basophils # (Auto) (test code = 704-7) 0.0          0.0-0.1        

           



[facility] HospitalFluoroscopic procedure less than one hour 
[id number] 05:24:00





             Test Item    Value        Reference Range Interpretation Comments

 

             Absolute Immature Granulocyte 0.08 10*3/uL 0-0.1                   

  



             (auto (test code = Absolute                                        



             Immature Granulocyte (auto)                                        



[facility] leukocytes automated count 
(number/volume)2020 05:24:00





             Test Item    Value        Reference Range Interpretation Comments

 

             White Blood Count (test code = 9.55 10*3/uL 4.8-10.8               

   



             6690-2)                                             



[facility] erythrocytes automated count 
(number/volume)2020 05:24:00





             Test Item    Value        Reference Range Interpretation Comments

 

             Red Blood Count (test code = 3.89 10*6/mL 3.6-5.1                  

 



             789-8)                                              



[facility] hemoglobin measurement (moles/volume)
2020 05:24:00





             Test Item    Value        Reference Range Interpretation Comments

 

             Hemoglobin (test code = 76460-7) 12.0 g/dL    12.0-16.0            

     



[facility] HospitalAutom[facility] blood hematocrit (volume fraction)
2020 05:24:00





             Test Item    Value        Reference Range Interpretation Comments

 

             Hematocrit (test code = 4544-3) 35.8 %       34.2-44.1             

    



[facility] HospitalAutom[facility] erythrocyte mean corpuscular volume
2020 05:24:00





             Test Item    Value        Reference Range Interpretation Comments

 

             Mean Corpuscular Volume (test code = 92.0         81-99            

         



             787-2)                                              



[facility] HospitalAutom[facility] erythrocyte mean corpuscular 
hemoglobin (mass per erythrocyte)2020 05:24:00





             Test Item    Value        Reference Range Interpretation Comments

 

             Mean Corpuscular Hemoglobin (test 30.8 pg      28-32               

      



             code = 785-6)                                        



[facility] HospitalAutom[facility] erythrocyte mean corpuscular 
hemoglobin concentration measurement (mass/volume)2020 05:24:00





             Test Item    Value        Reference Range Interpretation Comments

 

             Mean Corpuscular Hemoglobin Concent 33.5 g/dL    31-35             

        



             (test code = 786-4)                                        



[facility] HospitalRDW WavQr-Alm7897-30-14 05:24:00





             Test Item    Value        Reference Range Interpretation Comments

 

             Red Cell Distribution Width (test code 14.3 %       11.7-14.4      

           



             = 62755-1)                                          



[facility]Autom[facility] platelet count (count/volume)
2020 05:24:00





             Test Item    Value        Reference Range Interpretation Comments

 

             Platelet Count (test code = 298 10*3/uL  140-360                   



             777-3)                                              



[facility] HospitalAutCape Fear/[facility] Healthed blood segmented neutrophil count as
 percentage of total nnuadatmaj4349-75-40 05:24:00





             Test Item    Value        Reference Range Interpretation Comments

 

             Neutrophils (%) (Auto) (test code = 66.9 %       38.7-80.0         

        



             35216-8)                                            



[facility] HospitalAutom[facility] blood lymphocyte count as 
percentage ot total tdbqjrtrvy2337-92-66 05:24:00





             Test Item    Value        Reference Range Interpretation Comments

 

             Lymphocytes (%) (Auto) (test code = 22.2 %       18.0-39.1         

        



             736-9)                                              



[facility] HospitalAutom[facility] blood monocyte count as percentage 
of total ktoqpomteh0045-19-21 05:24:00





             Test Item    Value        Reference Range Interpretation Comments

 

             Monocytes (%) (Auto) (test code = 8.6 %        4.4-11.3            

      



             5905-5)                                             



[facility] HospitalAutom[facility] blood eosinophil count as 
percentage of total bmbhmpvmce7572-20-47 05:24:00





             Test Item    Value        Reference Range Interpretation Comments

 

             Eosinophils (%) (Auto) (test code = 1.2 %        0.0-6.0           

        



             713-8)                                              



[facility] HospitalAutom[facility] blood basophil count as percentage 
of total iqaucmcqmp5241-57-21 05:24:00





             Test Item    Value        Reference Range Interpretation Comments

 

             Basophils (%) (Auto) (test code = 0.3 %        0.0-1.0             

      



             706-2)                                              



[facility] HospitalFluoroscopic procedure less than one hour 
[id number] 05:24:00





             Test Item    Value        Reference Range Interpretation Comments

 

             IM GRANULOCYTES % (test code = IM 0.8 %        0.0-1.0             

      



             GRANULOCYTES %)                                        



[facility] HospitalAutom[facility] blood neutrophil glyjg8921-08-08 
05:24:00





             Test Item    Value        Reference Range Interpretation Comments

 

             Neutrophils # (Auto) (test code = 6.4          2.1-6.9             

      



             751-8)                                              



[facility] HospitalBlood lymphocytes count (number/volume)
2020 05:24:00





             Test Item    Value        Reference Range Interpretation Comments

 

             Lymphocytes # (Auto) (test code = 2.1          1.0-3.2             

      



             35870-1)                                            



[facility] HospitalBlood monocytes automated count 
(number/volume)2020 05:24:00





             Test Item    Value        Reference Range Interpretation Comments

 

             Monocytes # (Auto) (test code = 742-7) 0.8          0.2-0.8        

           



[facility] HospitalAutomated blood eosinophil uepwm2044-89-77 
05:24:00





             Test Item    Value        Reference Range Interpretation Comments

 

             Eosinophils # (Auto) (test code = 0.1          0.0-0.4             

      



             711-2)                                              



[facility] HospitalAutom[facility] blood basophil count (count/volume)
2020 05:24:00





             Test Item    Value        Reference Range Interpretation Comments

 

             Basophils # (Auto) (test code = 704-7) 0.0          0.0-0.1        

           



[facility] HospitalFluoroscopic procedure less than one hour 
[id number] 05:24:00





             Test Item    Value        Reference Range Interpretation Comments

 

             Absolute Immature Granulocyte 0.08 10*3/uL 0-0.1                   

  



             (auto (test code = Absolute                                        



             Immature Granulocyte (auto)                                        



[facility] sodium [facility] 
(moles/volume)2020 06:10:00





             Test Item    Value        Reference Range Interpretation Comments

 

             Sodium Level (test code = 2951-2) 141 mmol/L   136-145             

      



[facility] Hospitaler[facility] plasma potassium measurement 
(moles/volume)2020 06:10:00





             Test Item    Value        Reference Range Interpretation Comments

 

             Potassium Level (test code = 3.8 mmol/L   3.5-5.1                  

 



             2823-3)                                             



[facility] Hospitaler[facility] or plasma chloride measurement 
(moles/volume)2020 06:10:00





             Test Item    Value        Reference Range Interpretation Comments

 

             Chloride Level (test code = 111 mmol/L                       



             5-0)                                             



[facility] Hospitalerum or plasma carbon dioxide, total 
measurement (moles/volume)2020 06:10:00





             Test Item    Value        Reference Range Interpretation Comments

 

             Carbon Dioxide Level (test code = 23 mmol/L    -29               

      



             -9)                                             



[facility] Hospitalerum or plasma anion hox0255-65-28 06:10:00





             Test Item    Value        Reference Range Interpretation Comments

 

             Anion Gap (test code = 33037-3) 10.8 mmol/L  8-16                  

    



[facility] Hospitalerum [facility] plasma urea nitrogen measurement 
(mass/volume)2020 06:10:00





             Test Item    Value        Reference Range Interpretation Comments

 

             Blood Urea Nitrogen (test code = 7 mg/dL      7-26                 

     



             3094-0)                                             



[facility]er[facility] plasma creatinine measurement 
(mass/volume)2020 06:10:00





             Test Item    Value        Reference Range Interpretation Comments

 

             Creatinine (test code = 2160-0) 0.66 mg/dL   0.57-1.11             

    



[facility]erum or plasma urea nitrogen/creatinine mass
 [id number] 06:10:00





             Test Item    Value        Reference Range Interpretation Comments

 

             BUN/Creatinine Ratio (test code = 11           6-25                

      



             3097-3)                                             



[facility] HospitalEstimated glomerular filtration rate (GFR) 
kjzoriazeajwf5747-34-02 06:10:00





             Test Item    Value        Reference Range Interpretation Comments

 

             Estimat Glomerular Filtration > 60 mL/min  >60                     

  



             Rate (test code = 346853926)                                       

 



Ranges were taken from the National Kidney Disease Education Program and the 
National Kid[employer] Foundation literature.Reference ranges:60 or greater: Esyfwu62-
59 (for 3 consecutive months): Chronic kidneydisease 15 or less: Kidney failure
[facility] HospitalGlucose ueuhrrrmojc8264-25-76 06:10:00





             Test Item    Value        Reference Range Interpretation Comments

 

             Glucose Level (test code = YWM5813) 118 mg/dL                

        



[facility]er[facility] or plasma calcium measurement 
(mass/volume)2020 06:10:00





             Test Item    Value        Reference Range Interpretation Comments

 

             Calcium Level (test code = 15274-8) 8.7 mg/dL    8.4-10.2          

        



[facility] HospitalFluoroscopic procedure less than one hour 
fxqqxukn1624-70-08 06:10:00





             Test Item    Value        Reference Range Interpretation Comments

 

             Lactic Acid Level (test code = 0.7 mmol/L   0.5-2.0                

   



             Lactic Acid Level)                                        



[facility] Hospitaler[facility] or plasma sodium measurement 
(moles/volume)2020 06:10:00





             Test Item    Value        Reference Range Interpretation Comments

 

             Sodium Level (test code = 2951-2) 141 mmol/L   136-145             

      



[facility]er[facility] or plasma potassium measurement 
(moles/volume)2020 06:10:00





             Test Item    Value        Reference Range Interpretation Comments

 

             Potassium Level (test code = 3.8 mmol/L   3.5-5.1                  

 



             2823-3)                                             



[facility] Hospitalerum or plasma chloride measurement 
(moles/volume)2020 06:10:00





             Test Item    Value        Reference Range Interpretation Comments

 

             Chloride Level (test code = 111 mmol/L                       



             2075-0)                                             



[facility] Hospitalerum or plasma carbon dioxide, total 
measurement (moles/volume)2020 06:10:00





             Test Item    Value        Reference Range Interpretation Comments

 

             Carbon Dioxide Level (test code = 23 mmol/L    2028)                                             



[facility] Hospitalerum or plasma anion [id number] 06:10:00





             Test Item    Value        Reference Range Interpretation Comments

 

             Anion Gap (test code = 33037-3) 10.8 mmol/L  8-16                  

    



[facility] Hospitalerum or plasma urea nitrogen measurement 
(mass/volume)2020 06:10:00





             Test Item    Value        Reference Range Interpretation Comments

 

             Blood Urea Nitrogen (test code = 7 mg/dL                       

     



             3094-0)                                             



[facility]erum or plasma creatinine measurement 
(mass/volume)2020 06:10:00





             Test Item    Value        Reference Range Interpretation Comments

 

             Creatinine (test code = 2160-0) 0.66 mg/dL   0.57-1.11             

    



[facility]erum or plasma urea nitrogen/creatinine mass
 [id number] 06:10:00





             Test Item    Value        Reference Range Interpretation Comments

 

             BUN/Creatinine Ratio (test code = 11                           

      



             3097-3)                                             



[facility] HospitalEstimated glomerular filtration rate (GFR) 
ofocrwhpopsrm5165-14-26 06:10:00





             Test Item    Value        Reference Range Interpretation Comments

 

             Estimat Glomerular Filtration > 60 mL/min  >60                     

  



             Rate (test code = 072278163)                                       

 



Ranges were taken from the National Kidney Disease Education Program and the 
National Kid[employer] Foundation literature.Reference ranges:60 or greater: Myrzlx79-
59 (for 3 consecutive months): Chronic kidneydisease 15 or less: Kidney failure
[facility] HospitalGlucose [id number] 06:10:00





             Test Item    Value        Reference Range Interpretation Comments

 

             Glucose Level (test code = NEK8279) 118 mg/dL                

        



[facility]er[facility] or plasma calcium measurement 
(mass/volume)2020 06:10:00





             Test Item    Value        Reference Range Interpretation Comments

 

             Calcium Level (test code = 26262-8) 8.7 mg/dL    8.4-10.2          

        



[facility] HospitalFluoroscopic procedure less than one hour 
vhrcccly4880-86-25 06:10:00





             Test Item    Value        Reference Range Interpretation Comments

 

             Lactic Acid Level (test code = 0.7 mmol/L   0.5-2.0                

   



             Lactic Acid Level)                                        



[facility] HospitalClostridium difficile A and B toxin assay
2020 17:26:00





             Test Item    Value        Reference Range Interpretation Comments

 

             Clostridium Difficile Toxin A & B NEGATIVE     NEGATIVE            

      



             (test code = 978595932)                                        



Testing on stool aspirate specimens is outside  claims since 
specimen type not validatedon this assay.[facility]
Clostridium difficile A and B toxin [id number] 17:26:00





             Test Item    Value        Reference Range Interpretation Comments

 

             Clostridium Difficile Toxin A & B NEGATIVE     NEGATIVE            

      



             (test code = 500665946)                                        



Testing on stool aspirate specimens is outside  claims since 
specimen type not validatedon this assay.[facility]
Blood [id number] 16:40:00





             Test Item    Value        Reference Range Interpretation Comments

 

             Blood Culture (test NO GROWTH AFTER 48                           



             code = 93371238) HOURS                                  



[facility] HospitalBlood [id number] 16:40:00





             Test Item    Value        Reference Range Interpretation Comments

 

             Blood Culture (test NO GROWTH AFTER 5                           



             code = 81939612) DAYS, FINAL REPORT                           



[facility] plasma magnesium measurement 
(mass/volume)2020 15:10:00





             Test Item    Value        Reference Range Interpretation Comments

 

             Magnesium Level (test code = 1.4 mg/dL    1.3-2.1                  

 



             89986-0)                                            



[facility] plasma magnesium measurement 
(mass/volume)2020 15:10:00





             Test Item    Value        Reference Range Interpretation Comments

 

             Magnesium Level (test code = 1.4 mg/dL    1.3-2.1                  

 



             68247-1)                                            



[facility] HospitalCHEST SINGLE (PORTABLE)2020-12-10 21:56:00
************************************************************[facility] : 1955 Sex: 
F************************************************************  [facility] [address] [address] Patient Name: ALEK LAM MR #: [id number] : 1955 Age/Sex: 
65/F Acct #: [id number] Req #: [id number] Adm Physician: MARINO BETANCOURT MD  
Ordered by: MARINO BETANCOURT MD Report #: [id number] Location: [facility] Room/Bed:
 [facility] ____________________________________________________________________
_______________________________ Procedure: [id number] DX/CHEST SINGLE (PORTABLE) 
Exam Date: 12/10/20 Exam Time:  REPORT STATUS: Signed EXAMINATION: CHEST 
SINGLE (PORTABLE) INDICATION: SOB 2020 COMPARISON: None FINDINGS: 
Patient rotation mildly limits examination. Heart is nonenlarged. Low lung 
volumes. Mild elevation of the right hemidiaphragm. Patchy and linear lung 
opacities at the right base. Left lung is clear. No pneumothorax. No pleural 
effusion. IMPRESSION: Mild elevation of the right hemidiaphragm with patchy and 
strandy right basilar opacities which could represent atelectasis or pneumonia 
the appropriate clinical setting. Mild diffuse interstitial densities may 
represent a component of vascular congestion. Signed by: Janeen Ramirez MD on 
12/10/2020 9:59 PM Dictated By: TED RAMIREZ MD Electronically Signed By: JANEEN RAMIREZ MD on 12/10/20 2159 Transcribed By: ARON on 12/10/20 2159  COPY TO: 
MARINO BETANCOURT MD

## 2023-09-14 NOTE — ER
Nurse's Notes                                                                                     

 [facility]                                                                 

Name: Kailee Whitley                                                                             

Age: 68 yrs                                                                                       

Sex: Female                                                                                       

: 1955                                                                                   

MRN: [id number]                                                                                   

Arrival Date: 2023                                                                          

Time: 10:24                                                                                       

Account#: [id number]                                                                            

Bed 3                                                                                             

Private MD:                                                                                       

Diagnosis: Intertrochanteric left hip fracture                                                    

                                                                                                  

Presentation:                                                                                     

                                                                                             

10:27 Chief complaint: EMS states: toned out [place] for fall. C/O pain to left hip. Pt  ld1 

      fell from standing into bed, hit floor. Denies LOC, not on blood thinners. Coronavirus      

      screen: At this time, the client does not indicate any symptoms associated with             

      coronavirus-19. Ebola Screen: No symptoms or risks identified at this time. Initial         

      Sepsis Screen: Does the patient meet any 2 criteria? No. Patient's initial sepsis           

      screen is negative. Does the patient have a suspected source of infection? No.              

      Patient's initial sepsis screen is negative. Risk Assessment: Do you want to hurt           

      yourself or someone else? Patient reports no desire to harm self or others. Onset of        

      symptoms was 2023.                                                            

10:27 Method Of Arrival: EMS: [facility]                                                       ld1 

10:27 Acuity: LEATHA 3                                                                           ld1 

                                                                                                  

Triage Assessment:                                                                                

10:31 General: Appears in no apparent distress. uncomfortable, Behavior is cooperative,       ld1 

      appropriate for age, anxious. Pain: Complains of pain in left hip Pain does not             

      radiate. Pain currently is 8 out of 10 on a pain scale. Quality of pain is described as     

      throbbing. EENT: No signs and/or symptoms were reported regarding the EENT system.          

      Neuro: Level of Consciousness is awake, alert, obeys commands, Oriented to person,          

      place, time, situation. Cardiovascular: Capillary refill < 3 seconds Patient's skin is      

      warm and dry. Rhythm is sinus rhythm. Respiratory: Airway is patent Respiratory effort      

      is even, unlabored. GI: Abdomen is round non-distended. : No signs and/or symptoms        

      were reported regarding the genitourinary system. Derm: No signs and/or symptoms            

      reported regarding the dermatologic system. Musculoskeletal: No signs and/or symptoms       

      reported regarding the musculoskeletal system.                                              

                                                                                                  

Historical:                                                                                       

- Allergies:                                                                                      

10:30 No Known Allergies;                                                                     ld1 

- PMHx:                                                                                           

10:30 acid reflux; Anxiety; Cerebral infarction; cranial nerve disorder; CVA; depressive      ld1 

      disorder; diabetes mellitus; Diverticulitis; GERD; Glaucoma; Hyperlipidemia;                

      Hypertensive disorder; LEFT SIDED WEAKNESS; Osteoporosis; restless leg syndrome; TIA;       

                                                                                                  

- Immunization history:: Adult Immunizations up to date.                                          

- Social history:: Smoking status: Patient denies any tobacco usage or history of.                

- Family history:: not pertinent.                                                                 

                                                                                                  

                                                                                                  

Screening:                                                                                        

10:32 [facility] ED Fall Risk Assessment (Adult) History of falling in the last 3 months,       ld1 

      including since admission Yes- single mechanical fall (1 pt). Abuse screen: Denies          

      threats or abuse. Denies injuries from another. Nutritional screening: No deficits          

      noted. Tuberculosis screening: No symptoms or risk factors identified.                      

                                                                                                  

Assessment:                                                                                       

10:32 Reassessment: See triage assessment. Pt to CT.                                          ld1 

12:54 Reassessment: Patient appears in no apparent distress at this time. Patient and/or      iw  

      family updated on plan of care and expected duration. Pain level reassessed.                

14:07 Reassessment: No changes from previously documented assessment. Patient and/or family   ld1 

      updated on plan of care and expected duration. Pain level reassessed. Patient is alert,     

      oriented x 3, equal unlabored respirations, skin warm/dry/pink.                             

                                                                                                  

Vital Signs:                                                                                      

10:27  / 99; Pulse 79; Resp 19; Temp 98.3(O); Pulse Ox 99% on R/A; Weight 78.02 kg;     ld1 

      Height 5 ft. 3 in. ; Pain 10/10;                                                            

14:07  / 49; Pulse 89; Resp 18; Pulse Ox 95% on R/A;                                    ld1 

10:27 Body Mass Index 30.47 (78.02 kg, 160.02 cm)                                             ld1 

10:27 Pain Scale: Adult                                                                       ld1 

                                                                                                  

ED Course:                                                                                        

10:27 Patient arrived in ED.                                                                  ld1 

10:27 Mikael Rondon MD is Attending Physician.                                            rt  

10:30 Triage completed.                                                                       ld1 

10:31 Arm band placed on right wrist.                                                         ld1 

10:32 Patient has correct armband on for positive identification. Placed in gown. Bed in low  ld1 

      position. Call light in reach. Side rails up X2. Cardiac monitor on. Pulse ox on. NIBP      

      on. Door closed. Noise minimized. Warm blanket given.                                       

10:32 No provider procedures requiring assistance completed.                                  ld1 

10:48 CT Head C Spine In Process Unspecified.                                                 EDMS

10:48 Pelvis Wo Cont In Process Unspecified.                                                  EDMS

11:01 Hip Left 2 View XRAY In Process Unspecified.                                            EDMS

12:44 Trey Santiago MD is Hospitalizing Provider.                                            rt  

12:46 Hospitalizing Provider role handed off by Trey Santiago MD                             rt  

12:46 Skyler Pruitt MD is Hospitalizing Provider.                                           rt  

13:31 Chest Single View XRAY In Process Unspecified.                                          EDMS

16:24 Patient admitted, IV remains in place.                                                  ld1 

                                                                                                  

Administered Medications:                                                                         

12:20 Drug: morphine IVP or IV 4 mg Route: IVP; Infused Over: 4 mins; Site: right antecubital;ld1 

12:20 Drug: Ondansetron IVP 4 mg Route: IVP; Site: right antecubital;                         ld1 

                                                                                                  

                                                                                                  

Medication:                                                                                       

10:32 VIS not applicable for this client.                                                     ld1 

                                                                                                  

Outcome:                                                                                          

12:46 Decision to Hospitalize by Provider.                                                    rt  

16:23 Admitted to Med/surg accompanied by tech, via stretcher, room 205, Report called to     ldTon Molina RN                                                                                    

16:23 Condition: unchanged                                                                        

16:23 Instructed on the need for admit.                                                           

16:36 Patient left the ED.                                                                    iw  

                                                                                                  

Signatures:                                                                                       

Dispatcher [employer]                           Lori Orr RN                     RN   iw                                                   

Katie Baeza RN                        RN   ld1                                                  

Mikael Rondon MD MD   rt                                                   

                                                                                                  

**************************************************************************************************

## 2023-09-14 NOTE — RAD REPORT
EXAM DESCRIPTION:  CT - [id number] - 9/14/2023 10:46 am

 

CLINICAL HISTORY:  Trauma, head and neck injury.

TRAUMA

 

COMPARISON:  Head C Spine Mpr Wo Con dated 12/9/2020; Soft Tissue Neck W/Contr dated 3/14/2018

 

TECHNIQUE:  Axial thin cut noncontrast CT images of the head were obtained.

 

Axial thin cut noncontrast CT images of the cervical spine were obtained.

 

Multiplanar reformatted images were generated and reviewed.

 

All CT scans are performed using dose optimization technique as appropriate and may include automated
 exposure control or mA/KV adjustment according to patient size.

 

FINDINGS:  CT HEAD WITHOUT CONTRAST:

 

No acute hemorrhage, hydrocephalus or extra-axial collection is identified.Stable right frontal encep
halomalacia.No areas of brain edema or midline shift.

 

The paranasal sinuses and mastoids are clear.The calvarium is intact.

 

CT CERVICAL SPINE WITHOUT CONTRAST:

 

Straightening of normal cervical lordosis which may be positional or secondary to muscle spasm. Mild 
degenerative changes of the endplates and facet joints with mild-to-moderate disc height loss at C3-4
 up to moderate right neural foraminal narrowing at C3-4 and to lesser degree at C4-5. No fracture or
 subluxation.No prevertebral soft tissues swelling is identified.

 

IMPRESSION:  No acute traumatic intracranial or cervical spine findings.

 

Cervical spine degenerative changes as above.

 

Stable right frontal encephalomalacia.

## 2023-09-14 NOTE — RAD REPORT
EXAM DESCRIPTION:  RAD - Hip Left 2 View - 9/14/2023 10:59 am

 

CLINICAL HISTORY:  PAIN

 

COMPARISON:  Hip Left 2 View dated 9/9/2022

 

TECHNIQUE:  Left hip, AP and frogleg views of the left hip.

 

FINDINGS:  Comminuted left hip intertrochanteric fracture. Surrounding soft tissue swelling. No dislo
cation.

 

IMPRESSION:  Comminuted left hip intertrochanteric fracture.

## 2023-09-14 NOTE — RAD REPORT
EXAM DESCRIPTION:  CT - Pelvis Wo Cont - 9/14/2023 10:46 am

 

CLINICAL HISTORY:  FALL, HIP PAIN

 

COMPARISON:  No comparisons

 

TECHNIQUE:  Thin cut axial CT imaging of the pelvis was performed without IV contrast. Multiplanar re
formats were generated and reviewed.

 

All CT scans are performed using dose optimization technique as appropriate and may include automated
 exposure control or mA/KV adjustment according to patient size.

 

FINDINGS:  Comminuted and impacted left hip intertrochanteric fracture. Displaced components of the f
racture involves the base of the lesser trochanter. Fracture line courses obliquely along the proxima
l femoral shaft.

 

Visualized aspects of the right kidney demonstrate mild prominence of the renal pelvis, which could b
e an extrarenal pelvis, and small nonobstructing renal calculi largest measuring 4 millimeter. No hyd
roureter.

 

No dilated bowel loops or bowel wall thickening is visualized. No free air, free fluid or inflammator
y stranding. No hernia, mass or bulky lymphadenopathy. The urinary bladder is without significant fin
ding.

 

Mild bilateral hip joint degenerative changes. Mild degenerative changes of the lower lumbar spine.

 

 

IMPRESSION:  Comminuted, impacted, left hip intertrochanteric fracture as above.

 

Incidentally noted nonobstructing right renal calculi. Probable right extrarenal pelvis.

## 2023-09-14 NOTE — RAD REPORT
EXAM DESCRIPTION:  RAD - Chest Single View - 9/14/2023 1:30 pm

 

CLINICAL HISTORY:  preop

Chest pain.

 

COMPARISON:  Chest Single View dated 9/9/2022; Chest Single View dated 12/9/2020; Chest Single View d
ated 12/5/2019; Chest Single View dated 8/8/2016

 

FINDINGS:  Portable technique limits examination quality.

 

Exam is very limited by patient positioning. There is presumed soft tissue artifact present and skin 
fold projecting over the left hemithorax. Lungs appear grossly acute infiltrate heart is upper limit 
normal in size.

## 2023-09-14 NOTE — P.HP
Certification for Inpatient


Patient admitted to: Inpatient


With expected LOS: >2 Midnights


Patient will require the following post-hospital care: None


Practitioner: I am a practitioner with admitting privileges, knowledge of 

patient current condition, hospital course, and medical plan of care.


Services: Services provided to patient in accordance with Admission requirements

found in Title 42 Section 412.3 of the Code of Federal Regulations





Patient History


Date of Service: 09/14/23


History of Present Illness: 





68-year-old female presents to the ED via EMS with complaints of fall injury, 

hip pain.  Patient uses a wheelchair regularly and can usually transfer 

independently.  Patient fell while trying to get in bed via the left side which 

is her weakest side and "found herself on the floor".  Patient hit her head on 

the ground. Patient denies loss of consciousness and does report pain to the 

left hip worse with movement. Patient has a past medical history of gastric 

reflux anxiety, cerebral infarct with left-sided weakness, depressive disorder, 

diabetes type 2, diverticulitis, glaucoma, hyperlipidemia, hypertensive 

disorder, osteoporosis, restless leg syndrome.





ED course: Pelvic CT and hip x-ray reveals comminuted and impacted left hip 

intratrochanter fracture, CT head negative, chest x-ray is limited due to 

patient's positioning on right side.  WBC 16, PT/INR 11/1, BUN/creatinine 

17/1.06, serum glucose 204. 





Patient will be admitted for orthopedic surgery tomorrow (9/14) and further 

treatment.


Allergies





NKDA Allergy (Uncoded 11/15/15 14:08)


   Unknown


No Known Allergies Allergy (Uncoded 08/08/16 21:12)


   Unknown





Home Medications: 








Atorvastatin Calcium [Lipitor*] 20 mg PO BEDTIME 08/12/14 


Ca/D3/Mag Ox/Zinc//Anshul/Bor [Calcium 600-D3 Plus Caplet] 1 tab PO DAILY 

08/12/14 


Docusate [Colace Cap*] 200 mg PO DAILY 08/12/14 


Duloxetine HCl [Cymbalta] 120 mg PO BEDTIME 08/12/14 


Hydrocodone/Acetaminophen [Vicodin 5-325 mg Tablet] 1 each PO TID 08/12/14 


Pantoprazole Sodium [Protonix] 40 mg PO BID 08/12/14 


Pregabalin [Lyrica] 100 mg PO TID 08/12/14 


Ropinirole HCl 1 mg PO BEDTIME PRN PRN 08/12/14 


Trazodone HCl [Desyrel] 300 mg PO BEDTIME 08/12/14 


Aspirin Chewable [Aspirin Chewable*] 81 mg PO DAILY 09/14/23 


Baclofen 10 mg PO Q8H 09/14/23 


Bupropion *Xl* [Wellbutrin XL] 300 mg PO DAILY 09/14/23 


Buspirone HCl [Buspar] 30 mg PO BID 09/14/23 


Fenofibrate [Tricor] 145 mg PO BEDTIME 09/14/23 


Glimepiride [Amaryl] 4 mg PO DAILY 09/14/23 


Latanoprost Ophth [Xalatan 0.005%*] 1 gtt RIGHT EYE BEDTIME 09/14/23 


Metformin HCl [Glucophage] 500 mg PO BIDWM 09/14/23 


Mirabegron [Myrbetriq] 50 mg PO DAILY 09/14/23 








- Past Medical/Surgical History


Diabetic: No


-: CVA let side paraylized


-: high cholesterol


-: Acid Reflux


-: Hyperlipidemia


-: Anxiety


-: Depression


-: Mi[place]


-: c section


-: guillermo


-: hyst


-: Rt carotid artery surgery





- Family History


  ** Mother


-: Heart disease, Hypertension, GI disease, Diabetes, Cancer, Kidney disease


Notes: colon ca





  ** Father


-: Heart disease, Hypertension





- Social History


Alcohol use: Yes


CD- Drugs: No


Caffeine use: Yes





Review of Systems


General: Unremarkable


Eyes: Unremarkable


ENT: Unremarkable


Respiratory: Unremarkable


Cardiovascular: Unremarkable


Gastrointestinal: Unremarkable


Genitourinary: Unremarkable


Musculoskeletal: Leg Pain (left hip pain), As per HPI


Integumentary: Unremarkable


Neurological: Unremarkable





Physical Examination





- Physical Exam


General: Alert, Oriented x3, Acute distress


HEENT: Atraumatic, Normocephalic, PERRLA


Neck: Supple, 2+ carotid pulse no bruit, JVD not distended


Respiratory: Clear to auscultation bilaterally, Normal air movement


Cardiovascular: No edema, Normal pulses, Regular rate/rhythm, Normal S1 S2


Capillary refill: <2 Seconds


Gastrointestinal: Normal bowel sounds, Soft and benign, Non-distended


Musculoskeletal: No clubbing, No swelling, No erythema


Integumentary: No rashes, No breakdown


Neurological: Normal speech


External genitalia: No edema





- Studies


Laboratory Data (last 24 hrs)











  09/14/23 09/14/23 09/14/23





  12:15 12:15 12:15


 


WBC    16.00 H


 


Hgb    12.9


 


Hct    38.6


 


Plt Count    391


 


PT  11.0  


 


INR  1.00  


 


APTT  30.8  


 


Sodium   139 


 


Potassium   4.2 


 


BUN   17 


 


Creatinine   1.06 H 


 


Glucose   204 H 


 


Total Bilirubin   0.2 


 


AST   26 


 


ALT   35 


 


Alkaline Phosphatase   55 











Assessment and Plan





- Plan


Assessment and Plan





Acute left hip fracture


Trauma fall during tranfer from wheelchair


-consult Dr. Simpson


-surgery 9/15 around noon


-NPO at midnight


-morphine with zofran for pain





Leukocytosis


-WBC 16


-likely reactive to injury


-monitor in AM labs





Diabetes mellitus


-accucheck and SSI ACHS





MARLEE


-creatinine 1.06


-gentle IVF





Hx HTN


-restart home medications


-monitor Q 4h





HLD


-restart home medications








DVT ppx: SDC until ortho allows lovenox


Full code


no concentrated sweets diet/ NPO after midnight








- Advance Directives


Does patient have a Living Will: No


Does patient have a Durable POA for Healthcare: No

## 2023-09-14 NOTE — EDPHYS
Physician Documentation                                                                           

 [facility]                                                                 

Name: Kailee Whitley                                                                             

Age: 68 yrs                                                                                       

Sex: Female                                                                                       

: 1955                                                                                   

MRN: [id number]                                                                                   

Arrival Date: 2023                                                                          

Time: 10:24                                                                                       

Account#: [id number]                                                                            

Bed 3                                                                                             

Private MD:                                                                                       

ED Physician Mikael Rondon                                                                     

HPI:                                                                                              

                                                                                             

10:41 This 68 yrs old Female presents to ER via EMS with complaints of Fall Injury, Hip Pain. rt  

10:44 Patient presents to the ED with a fall. The patient reportedly was transferring from    rt  

      her wheelchair to the bed when she lost her balance, she hit her left hip onto the side     

      rail, reportedly hit her head on the ground, denies loss of consciousness. Does report      

      a pain to the left hip, worse with movement. Is aching nature, nonradiating. Denies         

      other acute complaints at this time. Symptoms are moderate severity, no other               

      aggravating or alleviating factors..                                                        

                                                                                                  

Historical:                                                                                       

- Allergies:                                                                                      

10:30 No Known Allergies;                                                                     ld1 

- PMHx:                                                                                           

10:30 acid reflux; Anxiety; Cerebral infarction; cranial nerve disorder; CVA; depressive      ld1 

      disorder; diabetes mellitus; Diverticulitis; GERD; Glaucoma; Hyperlipidemia;                

      Hypertensive disorder; LEFT SIDED WEAKNESS; Osteoporosis; restless leg syndrome; TIA;       

                                                                                                  

- Immunization history:: Adult Immunizations up to date.                                          

- Social history:: Smoking status: Patient denies any tobacco usage or history of.                

- Family history:: not pertinent.                                                                 

                                                                                                  

                                                                                                  

ROS:                                                                                              

10:44 Constitutional: Negative for fever, chills, and weight loss, Cardiovascular: Negative   rt  

      for chest pain, palpitations, and edema, Respiratory: Negative for shortness of breath,     

      cough, wheezing, and pleuritic chest pain, Abdomen/GI: Negative for abdominal pain,         

      nausea, vomiting, diarrhea, and constipation, Skin: Negative for injury, rash, and          

      discoloration.                                                                              

10:44 MS/extremity: Positive for injury or acute deformity, pain.                                 

                                                                                                  

Exam:                                                                                             

10:44 Constitutional:  This is a well developed, well nourished patient who is awake, alert,  rt  

      and in no acute distress. Head/Face:  Normocephalic, atraumatic. Chest/axilla:  Normal      

      chest wall appearance and motion.  Nontender with no deformity.  No lesions are             

      appreciated. Cardiovascular:  Regular rate and rhythm with a normal S1 and S2.  No          

      gallops, murmurs, or rubs.  Normal PMI, no JVD.  No pulse deficits. Respiratory:  Lungs     

      have equal breath sounds bilaterally, clear to auscultation and percussion.  No rales,      

      rhonchi or wheezes noted.  No increased work of breathing, no retractions or nasal          

      flaring. Abdomen/GI:  Soft, non-tender, with normal bowel sounds.  No distension or         

      tympany.  No guarding or rebound.  No evidence of tenderness throughout. Skin:  Warm,       

      dry with normal turgor.  Normal color with no rashes, no lesions, and no evidence of        

      cellulitis. Psych:  Awake, alert, with orientation to person, place and time.               

      Behavior, mood, and affect are within normal limits.                                        

10:44 Musculoskeletal/extremity: Tenderness to the left hip, range of motion limited due to       

      pain, pulses, motor, sensation intact.                                                      

12:43 ECG was reviewed by the Attending Physician.                                            rt  

                                                                                                  

Vital Signs:                                                                                      

10:27  / 99; Pulse 79; Resp 19; Temp 98.3(O); Pulse Ox 99% on R/A; Weight 78.02 kg;     ld1 

      Height 5 ft. 3 in. ; Pain 10/10;                                                            

14:07  / 49; Pulse 89; Resp 18; Pulse Ox 95% on R/A;                                    ld1 

10:27 Body Mass Index 30.47 (78.02 kg, 160.02 cm)                                             ld1 

10:27 Pain Scale: Adult                                                                       ld1 

                                                                                                  

MDM:                                                                                              

10:27 Patient medically screened.                                                             rt  

12:35 Differential diagnosis: Fracture, contusion, intracranial hemorrhage, closed head       rt  

      injury. Data reviewed: vital signs, nurses notes, radiologic studies. Consideration of      

      Admission/Observation Patient was admitted/placed on observation. Management of patient     

      was discussed with the following: Hospitalist: Agrees to admit. Consultant: Discussed       

      with orthopedic surgeon on-call, states the patient is okay to keep here.. I considered     

      the following discharge prescriptions or medication management in the emergency             

      department Medications were administered in the Emergency Department. See MAR.              

      Independent interpretation of the following test(s) in the Emergency Department X-Ray:      

      My interpretation is Intertrochanteric fracture seen on interpretation of the x-ray         

      images. Care significantly affected by the following chronic conditions: Prior CVA.         

      Counseling: I had a detailed discussion with the patient and/or guardian regarding the      

      historical points, exam findings, and any diagnostic results supporting the                 

      discharge/admit diagnosis, radiology results, the need for further work-up and              

      treatment in the hospital. Response to treatment: the patient's symptoms have markedly      

      improved after treatment.                                                                   

                                                                                                  

                                                                                             

11:53 Order name: CBC with Diff; Complete Time: 15:01                                         rt  

                                                                                             

11:53 Order name: CMP; Complete Time: 12:44                                                   rt  

                                                                                             

11:53 Order name: PT-INR; Complete Time: 12:44                                                rt  

                                                                                             

11:53 Order name: Ptt, Activated; Complete Time: 12:44                                        rt  

                                                                                             

14:59 Order name: CBC Smear Scan; Complete Time: 15:01                                        EDMS

                                                                                             

10:27 Order name: Hip Left 2 View XRAY; Complete Time: 11:19                                  rt  

                                                                                             

10:27 Order name: CT Head C Spine; Complete Time: 11:15                                       rt  

                                                                                             

10:42 Order name: Pelvis Wo Cont; Complete Time: 11:18                                        EDMS

                                                                                             

11:53 Order name: Chest Single View XRAY; Complete Time: 13:43                                rt  

                                                                                             

11:53 Order name: EKG; Complete Time: 11:54                                                   rt  

                                                                                             

11:53 Order name: EKG - Nurse/Tech; Complete Time: 12:57                                      rt  

                                                                                                  

EC:43 Rate is 83 beats/min. Rhythm is regular, Normal Sinus Rhythm with No ectopy. QRS Axis   rt  

      is Normal. MD interval is normal. QRS interval is normal. QT interval is normal. No Q       

      waves. T waves are Normal. No ST changes noted.                                             

                                                                                                  

Administered Medications:                                                                         

12:20 Drug: morphine IVP or IV 4 mg Route: IVP; Infused Over: 4 mins; Site: right antecubital;ld1 

12:20 Drug: Ondansetron IVP 4 mg Route: IVP; Site: right antecubital;                         ld1 

                                                                                                  

                                                                                                  

Disposition Summary:                                                                              

23 12:46                                                                                    

Hospitalization Ordered                                                                           

      Hospitalization Status: Inpatient Admission                                             rt  

      Provider: Skyler Pruitt                                                                rt  

      [facility] (In[facility]                                                 rt  

      Condition: Stable                                                                       rt  

      Problem: new                                                                            rt  

      Symptoms: are unchanged                                                                 rt  

      Bed/Room Type: Standard                                                                 rt  

      Room Assignment: 205(23 15:49)                                                    rd2 

      Diagnosis                                                                                   

        - Intertrochanteric left hip fracture                                                 rt  

      Forms:                                                                                      

        - Medication Reconciliation Form                                                      rt  

        - SBAR form                                                                           rt  

        - Leadership Thank You Letter                                                         rt  

Signatures:                                                                                       

Dispatcher [employer]                           Katie Good, RN                        RN   ld1                                                  

Mikael Rondon MD MD   rt                                                   

Yancy Adames RN                        RN   rd2                                                  

                                                                                                  

Corrections: (The following items were deleted from the chart)                                    

15:49 12:46 rt                                                                                rd2 

                                                                                                  

**************************************************************************************************

## 2023-09-15 LAB
BUN BLD-MCNC: 15 MG/DL (ref 7–18)
GLUCOSE SERPLBLD-MCNC: 181 MG/DL (ref 74–106)
HCT VFR BLD CALC: 37.2 % (ref 36–45)
LYMPHOCYTES # SPEC AUTO: 2.4 K/UL (ref 0.7–4.9)
MAGNESIUM SERPL-MCNC: 1.7 MG/DL (ref 1.6–2.4)
MCV RBC: 89.2 FL (ref 80–100)
PMV BLD: 7.7 FL (ref 7.6–11.3)
POTASSIUM SERPL-SCNC: 4 MEQ/L (ref 3.5–5.1)
RBC # BLD: 4.17 M/UL (ref 3.86–4.86)
WBC # BLD AUTO: 12.8 THOU/UL (ref 4.3–10.9)

## 2023-09-15 PROCEDURE — 0QH736Z INSERTION OF INTRAMEDULLARY INTERNAL FIXATION DEVICE INTO LEFT UPPER FEMUR, PERCUTANEOUS APPROACH: ICD-10-PCS

## 2023-09-15 RX ADMIN — SODIUM CHLORIDE SCH MLS: 0.45 INJECTION, SOLUTION INTRAVENOUS at 15:21

## 2023-09-15 RX ADMIN — HYDROCODONE BITARTRATE AND ACETAMINOPHEN PRN TAB: 5; 325 TABLET ORAL at 20:42

## 2023-09-15 RX ADMIN — PREGABALIN SCH: 50 CAPSULE ORAL at 08:31

## 2023-09-15 RX ADMIN — METFORMIN HYDROCHLORIDE SCH: 500 TABLET, FILM COATED ORAL at 08:00

## 2023-09-15 RX ADMIN — MORPHINE SULFATE ONE MG: 4 INJECTION, SOLUTION INTRAMUSCULAR; INTRAVENOUS at 13:42

## 2023-09-15 RX ADMIN — BACLOFEN SCH: 10 TABLET ORAL at 08:31

## 2023-09-15 RX ADMIN — HUMAN INSULIN SCH: 100 INJECTION, SOLUTION SUBCUTANEOUS at 07:30

## 2023-09-15 RX ADMIN — PREGABALIN SCH MG: 50 CAPSULE ORAL at 20:36

## 2023-09-15 RX ADMIN — MORPHINE SULFATE ONE MG: 4 INJECTION, SOLUTION INTRAMUSCULAR; INTRAVENOUS at 13:32

## 2023-09-15 RX ADMIN — LATANOPROST SCH: 50 SOLUTION OPHTHALMIC at 21:00

## 2023-09-15 RX ADMIN — SODIUM CHLORIDE SCH MLS: 9 INJECTION, SOLUTION INTRAVENOUS at 20:39

## 2023-09-15 RX ADMIN — GLIMEPIRIDE SCH: 2 TABLET ORAL at 08:30

## 2023-09-15 RX ADMIN — Medication SCH: at 08:31

## 2023-09-15 RX ADMIN — MORPHINE SULFATE ONE MG: 4 INJECTION, SOLUTION INTRAMUSCULAR; INTRAVENOUS at 13:48

## 2023-09-15 RX ADMIN — BACLOFEN SCH MG: 10 TABLET ORAL at 20:37

## 2023-09-15 RX ADMIN — ATORVASTATIN CALCIUM SCH MG: 20 TABLET, FILM COATED ORAL at 20:36

## 2023-09-15 RX ADMIN — PANTOPRAZOLE SODIUM SCH: 40 TABLET, DELAYED RELEASE ORAL at 07:30

## 2023-09-15 RX ADMIN — DOCUSATE SODIUM SCH: 100 CAPSULE, LIQUID FILLED ORAL at 08:31

## 2023-09-15 RX ADMIN — DULOXETINE HYDROCHLORIDE SCH MG: 30 CAPSULE, DELAYED RELEASE ORAL at 20:38

## 2023-09-15 RX ADMIN — BUPROPION HYDROCHLORIDE SCH: 150 TABLET, EXTENDED RELEASE ORAL at 08:31

## 2023-09-15 RX ADMIN — HUMAN INSULIN SCH: 100 INJECTION, SOLUTION SUBCUTANEOUS at 16:20

## 2023-09-15 RX ADMIN — HUMAN INSULIN SCH: 100 INJECTION, SOLUTION SUBCUTANEOUS at 20:58

## 2023-09-15 RX ADMIN — HYDROCODONE BITARTRATE AND ACETAMINOPHEN SCH: 5; 325 TABLET ORAL at 08:31

## 2023-09-15 RX ADMIN — MORPHINE SULFATE ONE MG: 4 INJECTION, SOLUTION INTRAMUSCULAR; INTRAVENOUS at 13:24

## 2023-09-15 RX ADMIN — MORPHINE SULFATE PRN MG: 2 INJECTION, SOLUTION INTRAMUSCULAR; INTRAVENOUS at 18:26

## 2023-09-15 RX ADMIN — MORPHINE SULFATE PRN MG: 2 INJECTION, SOLUTION INTRAMUSCULAR; INTRAVENOUS at 08:40

## 2023-09-15 RX ADMIN — BUSPIRONE HYDROCHLORIDE SCH: 5 TABLET ORAL at 08:31

## 2023-09-15 RX ADMIN — HUMAN INSULIN SCH: 100 INJECTION, SOLUTION SUBCUTANEOUS at 11:30

## 2023-09-15 RX ADMIN — MORPHINE SULFATE PRN MG: 2 INJECTION, SOLUTION INTRAMUSCULAR; INTRAVENOUS at 03:53

## 2023-09-15 RX ADMIN — FENTANYL CITRATE ONE MCG: 50 INJECTION, SOLUTION INTRAMUSCULAR; INTRAVENOUS at 11:21

## 2023-09-15 RX ADMIN — PANTOPRAZOLE SODIUM SCH MG: 40 TABLET, DELAYED RELEASE ORAL at 16:24

## 2023-09-15 RX ADMIN — FENOFIBRATE SCH MG: 160 TABLET ORAL at 20:38

## 2023-09-15 RX ADMIN — BACLOFEN SCH MG: 10 TABLET ORAL at 14:38

## 2023-09-15 RX ADMIN — PREGABALIN SCH MG: 50 CAPSULE ORAL at 14:37

## 2023-09-15 RX ADMIN — TRAZODONE HYDROCHLORIDE SCH MG: 150 TABLET ORAL at 20:53

## 2023-09-15 RX ADMIN — FENTANYL CITRATE ONE MCG: 50 INJECTION, SOLUTION INTRAMUSCULAR; INTRAVENOUS at 11:55

## 2023-09-15 RX ADMIN — SODIUM CHLORIDE SCH: 0.9 INJECTION, SOLUTION INTRAVENOUS at 08:20

## 2023-09-15 RX ADMIN — ASPIRIN 81 MG SCH: 81 TABLET ORAL at 08:30

## 2023-09-15 RX ADMIN — BUSPIRONE HYDROCHLORIDE SCH MG: 5 TABLET ORAL at 20:36

## 2023-09-15 RX ADMIN — METFORMIN HYDROCHLORIDE SCH MG: 500 TABLET, FILM COATED ORAL at 16:24

## 2023-09-15 NOTE — EKG
Test Date:    2023-09-14               Test Time:    12:41:21

Technician:   RACHEL                                    

                                                     

MEASUREMENT RESULTS:                                       

Intervals:                                           

Rate:         83                                     

HI:           158                                    

QRSD:         74                                     

QT:           382                                    

QTc:          448                                    

Axis:                                                

P:            72                                     

HI:           158                                    

QRS:          69                                     

T:            89                                     

                                                     

INTERPRETIVE STATEMENTS:                                       

                                                     

Normal sinus rhythm

Normal ECG

Compared to ECG 09/09/2022 09:10:40

Sinus bradycardia no longer present



Electronically Signed On 09-15-23 16:32:14 CDT by Gavin Shelley

## 2023-09-15 NOTE — OP
Surgeon:  Beto Montes MD



Assistant:  Talha.



Preoperative Diagnosis:  Left intertrochanteric hip fracture.



Postoperative Diagnosis:  Left intertrochanteric hip fracture.



Procedure Performed:  Intramedullary rodding, left intertrochanteric hip fracture.



Complications:  None.



Disposition:  To recovery room stable.



Estimated Blood Loss:  Minimal.



Operative Report In Detail:  Patient was taken to the operative suite, placed in supine position, ind
uced anesthesia.  Incision made over the tip of the greater trochanter.  The awl was utilized to crea
te an entry portal.  Single stage reaming for a 9 x 130 nail was placed.  Biplanar radiography verifi
ed placement of a 95 mm lag screw.  Distal interlocking was performed with a 32 mm screw.  Excellent 
reduction was realized at the completion of the procedure.  Patient tolerated the procedure well.  Le
ss than 50 cc of blood was lost.  Patient should be in the recovery room shortly.





VIVIANE/NOHEMY

DD:  09/15/2023 12:48:53Voice ID:  [id number]

DT:  09/15/2023 13:33:12Report ID:  [id number]

## 2023-09-15 NOTE — P.PN
Subjective


Date of Service: 09/15/23


Chief Complaint: Left hip fracture





Patient still complaining of severe pain in the left hip seen by orthopedic 

surgery





Review of Systems


Unremarkable





Physical Examination





- Vital Signs


Temperature: 97 F


Blood Pressure: 142/71


Pulse: 83


Respirations: 18


Pulse Ox (%): 91





- Physical Exam


General: Alert, Severe distress


Respiratory: Clear to auscultation bilaterally


Cardiovascular: No edema


Gastrointestinal: Normal bowel sounds





- Studies


Laboratory Data (last 24 hrs)











  09/14/23





  12:15


 


WBC  16.00 H


 


Hgb  12.9


 


Hct  38.6


 


Plt Count  391











Assessment And Plan





- Current Problems (Diagnosis)


(1) Intertrochanteric fracture of left hip


Current Visit: Yes   Status: Acute   


Plan: 


68 years of age admitted with intertrochanteric fracture of the left hip no 

obvious precipitating factor denies any syncopal episode or chest pain has been 

scheduled for surgery we will plan for more pain relief prior history of cardiac

or pulmonary disorders patient is a smoker cleared for surgery Labs reviewed 

white count is mildly elevated x-ray reports reviewed


Qualifiers: 


   Encounter type: initial encounter   Fracture type: closed   Fracture align

ment: displaced   Qualified Code(s): S72.142A - Displaced intertrochanteric 

fracture of left femur, initial encounter for closed fracture

## 2023-09-15 NOTE — RAD REPORT
EXAM DESCRIPTION:

RAD - Fluoroscopy <1 Hour - 9/15/2023 12:56 pm

 

CLINICAL HISTORY:  Hip fracture

 

FINDINGS:

 

Thirteen intraoperative fluoroscopic images obtained. Fluoroscopy time 1.5 minutes

 

Compression screw and intramedullary yo has been placed into the left femur affixing a fracture.

 

The surgery performed by Dr. Montes

## 2023-09-15 NOTE — RAD REPORT
EXAM DESCRIPTION:  RAD - Pelvis - 9/15/2023 1:34 pm

 

CLINICAL HISTORY:  Left femoral fracture

 

FINDINGS:  Intramedullary yo and compression screw affix a left femoral fracture in good position

 

No dislocation

## 2023-09-16 LAB
BUN BLD-MCNC: 18 MG/DL (ref 7–18)
GLUCOSE SERPLBLD-MCNC: 186 MG/DL (ref 74–106)
HCT VFR BLD CALC: 34.6 % (ref 36–45)
LYMPHOCYTES # SPEC AUTO: 2.3 K/UL (ref 0.7–4.9)
MAGNESIUM SERPL-MCNC: 1.9 MG/DL (ref 1.6–2.4)
MCV RBC: 89.9 FL (ref 80–100)
PMV BLD: 8.1 FL (ref 7.6–11.3)
POTASSIUM SERPL-SCNC: 3.9 MEQ/L (ref 3.5–5.1)
RBC # BLD: 3.84 M/UL (ref 3.86–4.86)
WBC # BLD AUTO: 11.5 THOU/UL (ref 4.3–10.9)

## 2023-09-16 RX ADMIN — BACLOFEN SCH MG: 10 TABLET ORAL at 09:03

## 2023-09-16 RX ADMIN — METFORMIN HYDROCHLORIDE SCH MG: 500 TABLET, FILM COATED ORAL at 16:24

## 2023-09-16 RX ADMIN — Medication SCH: at 08:48

## 2023-09-16 RX ADMIN — METFORMIN HYDROCHLORIDE SCH MG: 500 TABLET, FILM COATED ORAL at 09:02

## 2023-09-16 RX ADMIN — PANTOPRAZOLE SODIUM SCH MG: 40 TABLET, DELAYED RELEASE ORAL at 09:02

## 2023-09-16 RX ADMIN — HUMAN INSULIN SCH: 100 INJECTION, SOLUTION SUBCUTANEOUS at 07:30

## 2023-09-16 RX ADMIN — ASPIRIN 81 MG SCH MG: 81 TABLET ORAL at 09:03

## 2023-09-16 RX ADMIN — BACLOFEN SCH MG: 10 TABLET ORAL at 13:31

## 2023-09-16 RX ADMIN — ENOXAPARIN SODIUM SCH MG: 30 INJECTION SUBCUTANEOUS at 14:24

## 2023-09-16 RX ADMIN — PREGABALIN SCH MG: 50 CAPSULE ORAL at 21:13

## 2023-09-16 RX ADMIN — ATORVASTATIN CALCIUM SCH MG: 20 TABLET, FILM COATED ORAL at 21:13

## 2023-09-16 RX ADMIN — BUSPIRONE HYDROCHLORIDE SCH MG: 5 TABLET ORAL at 21:12

## 2023-09-16 RX ADMIN — HUMAN INSULIN SCH: 100 INJECTION, SOLUTION SUBCUTANEOUS at 15:56

## 2023-09-16 RX ADMIN — MORPHINE SULFATE PRN MG: 2 INJECTION, SOLUTION INTRAMUSCULAR; INTRAVENOUS at 10:03

## 2023-09-16 RX ADMIN — BUSPIRONE HYDROCHLORIDE SCH MG: 5 TABLET ORAL at 09:03

## 2023-09-16 RX ADMIN — PREGABALIN SCH MG: 50 CAPSULE ORAL at 09:03

## 2023-09-16 RX ADMIN — BUPROPION HYDROCHLORIDE SCH MG: 150 TABLET, EXTENDED RELEASE ORAL at 09:04

## 2023-09-16 RX ADMIN — HYDROCODONE BITARTRATE AND ACETAMINOPHEN PRN TAB: 5; 325 TABLET ORAL at 13:32

## 2023-09-16 RX ADMIN — HUMAN INSULIN SCH: 100 INJECTION, SOLUTION SUBCUTANEOUS at 11:30

## 2023-09-16 RX ADMIN — DULOXETINE HYDROCHLORIDE SCH MG: 30 CAPSULE, DELAYED RELEASE ORAL at 21:13

## 2023-09-16 RX ADMIN — PREGABALIN SCH MG: 50 CAPSULE ORAL at 13:31

## 2023-09-16 RX ADMIN — SODIUM CHLORIDE SCH MLS: 9 INJECTION, SOLUTION INTRAVENOUS at 03:49

## 2023-09-16 RX ADMIN — LATANOPROST SCH: 50 SOLUTION OPHTHALMIC at 21:00

## 2023-09-16 RX ADMIN — BACLOFEN SCH MG: 10 TABLET ORAL at 21:13

## 2023-09-16 RX ADMIN — MORPHINE SULFATE PRN MG: 2 INJECTION, SOLUTION INTRAMUSCULAR; INTRAVENOUS at 18:24

## 2023-09-16 RX ADMIN — PANTOPRAZOLE SODIUM SCH MG: 40 TABLET, DELAYED RELEASE ORAL at 16:24

## 2023-09-16 RX ADMIN — DOCUSATE SODIUM SCH MG: 100 CAPSULE, LIQUID FILLED ORAL at 09:03

## 2023-09-16 RX ADMIN — MORPHINE SULFATE PRN MG: 2 INJECTION, SOLUTION INTRAMUSCULAR; INTRAVENOUS at 14:24

## 2023-09-16 RX ADMIN — HYDROCODONE BITARTRATE AND ACETAMINOPHEN PRN TAB: 5; 325 TABLET ORAL at 03:48

## 2023-09-16 RX ADMIN — GLIMEPIRIDE SCH MG: 2 TABLET ORAL at 09:02

## 2023-09-16 RX ADMIN — ENOXAPARIN SODIUM SCH MG: 30 INJECTION SUBCUTANEOUS at 21:14

## 2023-09-16 RX ADMIN — TRAZODONE HYDROCHLORIDE SCH MG: 150 TABLET ORAL at 21:13

## 2023-09-16 RX ADMIN — HUMAN INSULIN SCH: 100 INJECTION, SOLUTION SUBCUTANEOUS at 21:00

## 2023-09-16 RX ADMIN — FENOFIBRATE SCH MG: 160 TABLET ORAL at 21:13

## 2023-09-16 NOTE — P.PN
Subjective


Date of Service: 09/16/23


Chief Complaint: Left hip fracture


Subjective: No new changes (Difficulty transferring to bedside chair due to 

pain)





Review of Systems


10-point ROS is otherwise unremarkable





Physical Examination





- Vital Signs


Temperature: 97.7 F


Blood Pressure: 125/70


Pulse: 86


Respirations: 17


Pulse Ox (%): 94





- Physical Exam


Musculoskeletal: Other (Dressings clean dry intact neurovascular function 

intact)





Assessment And Plan





- Current Problems (Diagnosis)


(1) Intertrochanteric fracture of left hip


Current Visit: Yes   Status: Acute   


Qualifiers: 


   Encounter type: initial encounter   Fracture type: closed   Fracture 

alignment: displaced   Qualified Code(s): S72.142A - Displaced intertrochanteric

fracture of left femur, initial encounter for closed fracture   





- Plan





When she is safe and stable she can go back to the facility that she came from 

with touchdown weightbearing with walker and therapy support only on the left 

leg such that if there was an egg in the shoe you do not break the egg she has 

only been able to do transfers for the last 15 years she can go back to that 

baseline she has weakness on the left side so has difficulty bearing weight on 

that side, she will follow-up in the office of [facility] orthopedics [place] 

on [address] 2 weeks postop with x-rays from the facility that she is staying 

in she will need to have DVT prophylaxis for 28 days postop aspirin 325 mg twice

daily is adequate unless she was on a blood thinner otherwise she will need pain

control


Discharge Plan: LTAC (She can return to the facility that she came from when she

is safe and stable for 15 years she has only been able to do transfers from bed 

to chair she is weak on her left side so she cannot bear weight on that side 

anyway it should be touchdown weightbearing only with the left leg for 6 weeks 

posto)

## 2023-09-16 NOTE — P.DS
Admission Date: 09/14/23


Discharge Date: 09/16/23


Disposition: TRANSFER TO NURSING HOME


Discharge Condition: FAIR


Reason for Admission: Left hip fracture





- Problems


(1) Intertrochanteric fracture of left hip


Current Visit: Yes   Status: Acute   


Qualifiers: 


   Encounter type: initial encounter   Fracture type: closed   Fracture 

alignment: displaced   Qualified Code(s): S72.142A - Displaced intertrochanteric

fracture of left femur, initial encounter for closed fracture   


Brief History of Present Illness: 





Patient is 68 years of age admitted for left intertrochanteric fracture


Hospital Course: 





Patient was admitted was operated today was doing fairly well pain had reduced 

in by orthopedics discharge instructions as below


When she is safe and stable she can go back to the facility that she came from 

with touchdown weightbearing with walker and therapy support only on the left 

leg such that if there was an egg in the shoe you do not break the egg she has 

only been able to do transfers for the last 15 years she can go back to that 

baseline she has weakness on the left side so has difficulty bearing weight on 

that side, she will follow-up in the office of [facility] orth[facility] [place] 

on 35 and 288 2 weeks postop with x-rays from the facility that she is staying 

in she will need to have DVT prophylaxis for 28 days postop aspirin 325 mg twice

daily is adequate unless she was on a blood thinner otherwise she will need pain

control


Discharge Plan: LTAC (She can return to the facility that she came from when she

is safe and stable for 15 years she has only been able to do transfers from bed 

to chair she is weak on her left side so she cannot bear weight on that side 

anyway it should be touchdown weightbearing only with the left leg for 6 weeks 

posto)





Patient to take aspirin for DVT prophylaxis resume all her home medications 

seeing home tomorrow also provide pain medications son at the bedside able to be

discharged back patient's vital signs stable chest clear no other complaints 

pain has improved


Vital Signs/Physical Exam: 














Temp Pulse Resp BP Pulse Ox


 


 97.7 F   86   17   125/70   94 


 


 09/16/23 10:25  09/16/23 10:25  09/16/23 10:25  09/16/23 10:25  09/16/23 10:25








Laboratory Data at Discharge: 














WBC  11.50 thou/uL (4.3-10.9)  H  09/16/23  02:42    


 


Hgb  11.7 g/dL (12.0-15.0)  L D 09/16/23  02:42    


 


Hct  34.6 % (36.0-45.0)  L  09/16/23  02:42    


 


Plt Count  348 thou/uL (152-406)   09/16/23  02:42    


 


PT  11.0 SECONDS (9.5-12.5)   09/14/23  12:15    


 


INR  1.00   09/14/23  12:15    


 


APTT  30.8 SECONDS (24.3-36.9)   09/14/23  12:15    


 


Sodium  140 mEq/L (136-145)   09/16/23  02:42    


 


Potassium  3.9 mEq/L (3.5-5.1)   09/16/23  02:42    


 


BUN  18 mg/dL (7-18)   09/16/23  02:42    


 


Creatinine  0.98 mg/dL (0.55-1.02)   09/16/23  02:42    


 


Glucose  186 mg/dL ()  H  09/16/23  02:42    


 


Phosphorus  2.8 mg/dL (2.5-4.9)   09/16/23  02:42    


 


Magnesium  1.9 mg/dL (1.6-2.4)   09/16/23  02:42    


 


Total Bilirubin  0.2 mg/dL (0.2-1.0)   09/14/23  12:15    


 


AST  26 U/L (15-37)   09/14/23  12:15    


 


ALT  35 U/L (13-56)   09/14/23  12:15    


 


Alkaline Phosphatase  55 U/L ()   09/14/23  12:15    








Home Medications: 








Atorvastatin Calcium [Lipitor*] 20 mg PO BEDTIME 08/12/14 


Ca/D3/Mag Ox/Zinc//Anshul/Bor [Calcium 600-D3 Plus Caplet] 1 tab PO DAILY 

08/12/14 


Docusate [Colace Cap*] 200 mg PO DAILY 08/12/14 


Duloxetine HCl [Cymbalta] 120 mg PO BEDTIME 08/12/14 


Hydrocodone/Acetaminophen [Vicodin 5-325 mg Tablet] 1 each PO TID 08/12/14 


Pantoprazole Sodium [Protonix] 40 mg PO BID 08/12/14 


Pregabalin [Lyrica] 100 mg PO TID 08/12/14 


Ropinirole HCl 1 mg PO BEDTIME PRN PRN 08/12/14 


Trazodone HCl [Desyrel] 300 mg PO BEDTIME 08/12/14 


Aspirin Chewable [Aspirin Chewable*] 81 mg PO DAILY 09/14/23 


Baclofen 10 mg PO Q8H 09/14/23 


Bupropion *Xl* [Wellbutrin XL*] 300 mg PO DAILY 09/14/23 


Buspirone HCl [Buspar*] 30 mg PO BID 09/14/23 


Fenofibrate [Tricor*] 145 mg PO BEDTIME 09/14/23 


Glimepiride [Amaryl*] 4 mg PO DAILY 09/14/23 


Latanoprost Ophth [Xalatan 0.005%*] 1 gtt RIGHT EYE BEDTIME 09/14/23 


Metformin HCl [Glucophage*] 500 mg PO BIDWM 09/14/23 


Mirabegron [Myrbetriq] 50 mg PO DAILY 09/14/23 





Physician Discharge Instructions: 


Please see discharge insructions from Orthopedics


When she is safe and stable she can go back to the facility that she came from 

with touchdown weightbearing with walker and therapy support only on the left 

leg such that if there was an egg in the shoe you do not break the egg she has 

only been able to do transfers for the last 15 years she can go back to that 

baseline she has weakness on the left side so has difficulty bearing weight on 

that side, she will follow-up in the office of [facility] orth[facility] [place] 

on [address] and 288 2 weeks postop with x-rays from the facility that she is staying 

in she will need to have DVT prophylaxis for 28 days postop aspirin 325 mg twice

 daily is adequate unless she was on a blood thinner otherwise she will need 

pain control


Discharge Plan: LTAC (She can return to the facility that she came from when she

 is safe and stable for 15 years she has only been able to do transfers from bed

 to chair she is weak on her left side so she cannot bear weight on that side 

anyway it should be touchdown weightbearing only with the left leg for 6 weeks 

posto)


Followup: 


NONE,NONE [Primary Care Provider] -

## 2023-09-17 VITALS — SYSTOLIC BLOOD PRESSURE: 134 MMHG | DIASTOLIC BLOOD PRESSURE: 76 MMHG | TEMPERATURE: 98.3 F

## 2023-09-17 VITALS — OXYGEN SATURATION: 90 %

## 2023-09-17 LAB
BUN BLD-MCNC: 24 MG/DL (ref 7–18)
GLUCOSE SERPLBLD-MCNC: 165 MG/DL (ref 74–106)
HCT VFR BLD CALC: 30.1 % (ref 36–45)
LYMPHOCYTES # SPEC AUTO: 2.7 K/UL (ref 0.7–4.9)
MAGNESIUM SERPL-MCNC: 2 MG/DL (ref 1.6–2.4)
MCV RBC: 89.9 FL (ref 80–100)
PMV BLD: 8.2 FL (ref 7.6–11.3)
POTASSIUM SERPL-SCNC: 3.6 MEQ/L (ref 3.5–5.1)
RBC # BLD: 3.35 M/UL (ref 3.86–4.86)
WBC # BLD AUTO: 9.9 THOU/UL (ref 4.3–10.9)

## 2023-09-17 RX ADMIN — PREGABALIN SCH MG: 50 CAPSULE ORAL at 08:34

## 2023-09-17 RX ADMIN — POTASSIUM & SODIUM PHOSPHATES POWDER PACK 280-160-250 MG SCH PKT: 280-160-250 PACK at 05:02

## 2023-09-17 RX ADMIN — ASPIRIN 81 MG SCH MG: 81 TABLET ORAL at 08:34

## 2023-09-17 RX ADMIN — DOCUSATE SODIUM SCH MG: 100 CAPSULE, LIQUID FILLED ORAL at 08:33

## 2023-09-17 RX ADMIN — GLIMEPIRIDE SCH MG: 2 TABLET ORAL at 08:34

## 2023-09-17 RX ADMIN — HUMAN INSULIN SCH: 100 INJECTION, SOLUTION SUBCUTANEOUS at 11:30

## 2023-09-17 RX ADMIN — ENOXAPARIN SODIUM SCH MG: 30 INJECTION SUBCUTANEOUS at 08:33

## 2023-09-17 RX ADMIN — BUPROPION HYDROCHLORIDE SCH MG: 150 TABLET, EXTENDED RELEASE ORAL at 08:34

## 2023-09-17 RX ADMIN — PANTOPRAZOLE SODIUM SCH MG: 40 TABLET, DELAYED RELEASE ORAL at 08:34

## 2023-09-17 RX ADMIN — METFORMIN HYDROCHLORIDE SCH MG: 500 TABLET, FILM COATED ORAL at 08:34

## 2023-09-17 RX ADMIN — BUSPIRONE HYDROCHLORIDE SCH MG: 5 TABLET ORAL at 08:34

## 2023-09-17 RX ADMIN — POTASSIUM & SODIUM PHOSPHATES POWDER PACK 280-160-250 MG SCH PKT: 280-160-250 PACK at 06:45

## 2023-09-17 RX ADMIN — SODIUM CHLORIDE SCH MLS: 0.45 INJECTION, SOLUTION INTRAVENOUS at 00:23

## 2023-09-17 RX ADMIN — POTASSIUM & SODIUM PHOSPHATES POWDER PACK 280-160-250 MG SCH PKT: 280-160-250 PACK at 06:03

## 2023-09-17 RX ADMIN — BACLOFEN SCH MG: 10 TABLET ORAL at 08:34

## 2023-09-17 RX ADMIN — HUMAN INSULIN SCH: 100 INJECTION, SOLUTION SUBCUTANEOUS at 07:30

## 2023-09-17 RX ADMIN — Medication SCH: at 08:34

## 2023-09-17 NOTE — P.PN
Subjective


Date of Service: 09/17/23


Chief Complaint: Left hip fracture


Subjective: Tolerating diet, Improving, Working w/ PT, Doing well (ready  fro 

disposition  from ortho  standpoint)





Physical Examination





- Vital Signs


Temperature: 98.2 F


Blood Pressure: 137/79


Pulse: 71


Respirations: 16


Pulse Ox (%): 90

## 2023-09-17 NOTE — P.PN
Date of Service: 09/17/23





Patient is 68 years of age was discharged yesterday still Complaining of Some 

Pain in Her Left Hips after the Surgery She Was Kept Here for 1 More Day Today 

She Is Doing Much Better and Has Lives in Assisted Living Has Pain Medication at

Home white count is normal I am not sure why phosphatase decreased was normal 

before patient is stable condition to be discharged vital signs stable